# Patient Record
Sex: MALE | Race: WHITE | NOT HISPANIC OR LATINO
[De-identification: names, ages, dates, MRNs, and addresses within clinical notes are randomized per-mention and may not be internally consistent; named-entity substitution may affect disease eponyms.]

---

## 2020-03-15 ENCOUNTER — TRANSCRIPTION ENCOUNTER (OUTPATIENT)
Age: 65
End: 2020-03-15

## 2022-02-02 ENCOUNTER — NON-APPOINTMENT (OUTPATIENT)
Age: 67
End: 2022-02-02

## 2022-02-02 PROBLEM — Z82.3 FAMILY HISTORY OF STROKE: Status: ACTIVE | Noted: 2022-02-02

## 2022-02-02 PROBLEM — Z82.49 FAMILY HISTORY OF ESSENTIAL HYPERTENSION: Status: ACTIVE | Noted: 2022-02-02

## 2022-02-02 RX ORDER — ASPIRIN ENTERIC COATED TABLETS 81 MG 81 MG/1
81 TABLET, DELAYED RELEASE ORAL
Refills: 0 | Status: ACTIVE | COMMUNITY

## 2022-02-02 RX ORDER — ROSUVASTATIN CALCIUM 10 MG/1
10 TABLET, FILM COATED ORAL
Refills: 0 | Status: ACTIVE | COMMUNITY

## 2022-02-03 ENCOUNTER — APPOINTMENT (OUTPATIENT)
Dept: SURGERY | Facility: CLINIC | Age: 67
End: 2022-02-03
Payer: MEDICARE

## 2022-02-03 ENCOUNTER — NON-APPOINTMENT (OUTPATIENT)
Age: 67
End: 2022-02-03

## 2022-02-03 VITALS
SYSTOLIC BLOOD PRESSURE: 134 MMHG | HEART RATE: 51 BPM | HEIGHT: 66 IN | BODY MASS INDEX: 26.52 KG/M2 | DIASTOLIC BLOOD PRESSURE: 76 MMHG | OXYGEN SATURATION: 99 % | WEIGHT: 165 LBS

## 2022-02-03 VITALS — TEMPERATURE: 96.6 F

## 2022-02-03 VITALS — TEMPERATURE: 96.3 F

## 2022-02-03 DIAGNOSIS — Z82.49 FAMILY HISTORY OF ISCHEMIC HEART DISEASE AND OTHER DISEASES OF THE CIRCULATORY SYSTEM: ICD-10-CM

## 2022-02-03 DIAGNOSIS — Z87.891 PERSONAL HISTORY OF NICOTINE DEPENDENCE: ICD-10-CM

## 2022-02-03 DIAGNOSIS — Z78.9 OTHER SPECIFIED HEALTH STATUS: ICD-10-CM

## 2022-02-03 DIAGNOSIS — Z82.3 FAMILY HISTORY OF STROKE: ICD-10-CM

## 2022-02-03 DIAGNOSIS — Z80.1 FAMILY HISTORY OF MALIGNANT NEOPLASM OF TRACHEA, BRONCHUS AND LUNG: ICD-10-CM

## 2022-02-03 PROCEDURE — 99203 OFFICE O/P NEW LOW 30 MIN: CPT

## 2022-02-03 RX ORDER — UBIDECARENONE/VIT E ACET 100MG-5
CAPSULE ORAL
Refills: 0 | Status: ACTIVE | COMMUNITY

## 2022-02-03 RX ORDER — ASCORBIC ACID 500 MG
TABLET ORAL
Refills: 0 | Status: ACTIVE | COMMUNITY

## 2022-02-03 RX ORDER — ZINC SULFATE 50(220)MG
CAPSULE ORAL
Refills: 0 | Status: ACTIVE | COMMUNITY

## 2022-02-03 NOTE — CONSULT LETTER
[Dear  ___] : Dear  [unfilled], [Consult Letter:] : I had the pleasure of evaluating your patient, [unfilled]. [Please see my note below.] : Please see my note below. [Consult Closing:] : Thank you very much for allowing me to participate in the care of this patient.  If you have any questions, please do not hesitate to contact me. [Sincerely,] : Sincerely, [FreeTextEntry3] : Marty Hall MD, FACS

## 2022-02-03 NOTE — PLAN
[FreeTextEntry1] : Mr. SALAZAR  was told significance of findings, options, risks and benefits were explained.  Informed consent for right inguinal hernia  and umbilical hernia repairs and potential risks, benefits and alternatives (surgical options were discussed including non-surgical options or the option of no surgery) to the planned surgery were discussed in depth.  All surgical options were discussed including non-surgical treatments.  He wishes to proceed with surgery.  We will plan for surgery on at the next available date, pending any required insurance pre-certification or pre-approval. He agrees to obtain any necessary pre-operative evaluations and testing prior to surgery.\par Patient advised to seek immediate medical attention with any acute change in symptoms or with the development of any new or worsening symptoms.  Patient's questions and concerns addressed to patient's satisfaction, and patient verbalized an understanding of the information discussed.\par \par

## 2022-02-03 NOTE — HISTORY OF PRESENT ILLNESS
[de-identified] : Mr. RICKI SALAZAR is  a 66 year old male who was referred by Dr. Britni Galo with the chief complaint of having pain and swelling in his Right  groin.  He has had the condition for 1 year  and is worse when he is walking or standing.  He is stating that the hernia is getting bigger and symptomatic. He denies any fever or  night sweats. Appetite is good and weight is stable.  he also has umbilical hernia  for many years and  it causes discomfort on and off \par

## 2022-02-16 ENCOUNTER — OUTPATIENT (OUTPATIENT)
Dept: OUTPATIENT SERVICES | Facility: HOSPITAL | Age: 67
LOS: 1 days | End: 2022-02-16
Payer: MEDICARE

## 2022-02-16 VITALS
HEIGHT: 67 IN | DIASTOLIC BLOOD PRESSURE: 77 MMHG | SYSTOLIC BLOOD PRESSURE: 126 MMHG | HEART RATE: 55 BPM | OXYGEN SATURATION: 97 % | TEMPERATURE: 98 F | RESPIRATION RATE: 18 BRPM

## 2022-02-16 DIAGNOSIS — Z01.818 ENCOUNTER FOR OTHER PREPROCEDURAL EXAMINATION: ICD-10-CM

## 2022-02-16 DIAGNOSIS — K42.9 UMBILICAL HERNIA WITHOUT OBSTRUCTION OR GANGRENE: ICD-10-CM

## 2022-02-16 DIAGNOSIS — I10 ESSENTIAL (PRIMARY) HYPERTENSION: ICD-10-CM

## 2022-02-16 DIAGNOSIS — K40.90 UNILATERAL INGUINAL HERNIA, WITHOUT OBSTRUCTION OR GANGRENE, NOT SPECIFIED AS RECURRENT: ICD-10-CM

## 2022-02-16 DIAGNOSIS — Z98.890 OTHER SPECIFIED POSTPROCEDURAL STATES: Chronic | ICD-10-CM

## 2022-02-16 PROCEDURE — G0463: CPT

## 2022-02-16 NOTE — H&P PST ADULT - NSANTHOSAYNRD_GEN_A_CORE
No. ALVINO screening performed.  STOP BANG Legend: 0-2 = LOW Risk; 3-4 = INTERMEDIATE Risk; 5-8 = HIGH Risk

## 2022-02-16 NOTE — H&P PST ADULT - TOBACCO FREE, LONGEST PERIOD, PROFILE
Continue Regimen: Soolantra apply a pea sized amount to full face after cleansing once daily Otc Regimen: Continue antihistamine daily Detail Level: Zone Initiate Treatment: Hydrocortisone- apply to the affected areas twice daily X 2 weeks to the eyelids , then as needed Continue Regimen: Spironolactone 50 mg take two once nightly\\n\\nSkin Medica Retinol 20 years Continue Regimen: Ciclopirox Lather onto Scalp and face , Let Sit For 3-5mins Then Rinse. May Follow with regular Shampoo & Conditioner fir the hair .

## 2022-02-16 NOTE — H&P PST ADULT - NSICDXPASTMEDICALHX_GEN_ALL_CORE_FT
PAST MEDICAL HISTORY:  HLD (hyperlipidemia)     HTN (hypertension)     Right inguinal hernia     Umbilical hernia      PAST MEDICAL HISTORY:  COVID-19 virus infection February 2020    HLD (hyperlipidemia)     HTN (hypertension)     Right inguinal hernia     Umbilical hernia

## 2022-02-16 NOTE — H&P PST ADULT - HISTORY OF PRESENT ILLNESS
This is a 66 year old male with PMH of HLD, Hypertension who presents with c/o intermittent right inguinal pain due to hernia for . Pt reports worsening of pain with activity. Pt for inguinal hernia repair on  This is a 66 year old male with PMH of Hyperlipidemia, Hypertension, COVID-19 virus infection in February 2020 who presents with c/o constant right inguinal pain due to hernia for about 1 year. Pt reports worsening of pain with ambulation and strenuous activity. Pt also c/o umbilical hernia for years that has been increasing in size and causing minimal discomfort. Pt is scheduled for right inguinal and umbilical hernia repair on 02/23/2022.

## 2022-02-16 NOTE — H&P PST ADULT - ASSESSMENT
This is a 66 year old male with PMH of Hyperlipidemia, Hypertension, COVID-19 virus infection in February 2020 who presents with right inguinal hernia and umbilical hernia. Pt is scheduled for right inguinal and umbilical hernia repair on 02/23/2022.  ALVINO stop bang score 3. Pt never had sleep study done, is at intermediate risk for sleep apnea and at risk for pulmonary complications.

## 2022-02-23 ENCOUNTER — APPOINTMENT (OUTPATIENT)
Dept: SURGERY | Facility: HOSPITAL | Age: 67
End: 2022-02-23

## 2022-02-23 PROBLEM — U07.1 COVID-19: Chronic | Status: ACTIVE | Noted: 2022-02-16

## 2022-02-23 PROBLEM — E78.5 HYPERLIPIDEMIA, UNSPECIFIED: Chronic | Status: ACTIVE | Noted: 2022-02-16

## 2022-02-23 PROBLEM — I10 ESSENTIAL (PRIMARY) HYPERTENSION: Chronic | Status: ACTIVE | Noted: 2022-02-16

## 2022-02-23 PROBLEM — K42.9 UMBILICAL HERNIA WITHOUT OBSTRUCTION OR GANGRENE: Chronic | Status: ACTIVE | Noted: 2022-02-16

## 2022-02-23 PROBLEM — K40.90 UNILATERAL INGUINAL HERNIA, WITHOUT OBSTRUCTION OR GANGRENE, NOT SPECIFIED AS RECURRENT: Chronic | Status: ACTIVE | Noted: 2022-02-16

## 2022-03-07 ENCOUNTER — TRANSCRIPTION ENCOUNTER (OUTPATIENT)
Age: 67
End: 2022-03-07

## 2022-03-07 LAB — SARS-COV-2 N GENE NPH QL NAA+PROBE: NOT DETECTED

## 2022-03-08 ENCOUNTER — APPOINTMENT (OUTPATIENT)
Dept: SURGERY | Facility: HOSPITAL | Age: 67
End: 2022-03-08
Payer: MEDICARE

## 2022-03-08 ENCOUNTER — OUTPATIENT (OUTPATIENT)
Dept: OUTPATIENT SERVICES | Facility: HOSPITAL | Age: 67
LOS: 1 days | End: 2022-03-08
Payer: MEDICARE

## 2022-03-08 VITALS
WEIGHT: 160.06 LBS | DIASTOLIC BLOOD PRESSURE: 80 MMHG | OXYGEN SATURATION: 100 % | HEART RATE: 52 BPM | SYSTOLIC BLOOD PRESSURE: 122 MMHG | TEMPERATURE: 98 F | RESPIRATION RATE: 16 BRPM | HEIGHT: 67 IN

## 2022-03-08 VITALS
OXYGEN SATURATION: 97 % | HEART RATE: 51 BPM | TEMPERATURE: 98 F | RESPIRATION RATE: 16 BRPM | SYSTOLIC BLOOD PRESSURE: 104 MMHG | DIASTOLIC BLOOD PRESSURE: 55 MMHG

## 2022-03-08 DIAGNOSIS — K42.9 UMBILICAL HERNIA WITHOUT OBSTRUCTION OR GANGRENE: ICD-10-CM

## 2022-03-08 DIAGNOSIS — Z01.818 ENCOUNTER FOR OTHER PREPROCEDURAL EXAMINATION: ICD-10-CM

## 2022-03-08 DIAGNOSIS — Z98.890 OTHER SPECIFIED POSTPROCEDURAL STATES: Chronic | ICD-10-CM

## 2022-03-08 DIAGNOSIS — K40.90 UNILATERAL INGUINAL HERNIA, WITHOUT OBSTRUCTION OR GANGRENE, NOT SPECIFIED AS RECURRENT: ICD-10-CM

## 2022-03-08 PROCEDURE — 49585: CPT | Mod: AS

## 2022-03-08 PROCEDURE — 49585: CPT

## 2022-03-08 PROCEDURE — 49505 PRP I/HERN INIT REDUC >5 YR: CPT | Mod: AS,RT

## 2022-03-08 PROCEDURE — C1781: CPT

## 2022-03-08 PROCEDURE — 49505 PRP I/HERN INIT REDUC >5 YR: CPT | Mod: RT

## 2022-03-08 PROCEDURE — 49505 PRP I/HERN INIT REDUC >5 YR: CPT

## 2022-03-08 DEVICE — MESH HERNIA MARLEX 3X6IN: Type: IMPLANTABLE DEVICE | Status: FUNCTIONAL

## 2022-03-08 DEVICE — MESH HERNIA PROLENE 3X6IN: Type: IMPLANTABLE DEVICE | Status: FUNCTIONAL

## 2022-03-08 RX ORDER — OXYCODONE AND ACETAMINOPHEN 5; 325 MG/1; MG/1
1 TABLET ORAL EVERY 4 HOURS
Refills: 0 | Status: DISCONTINUED | OUTPATIENT
Start: 2022-03-08 | End: 2022-03-08

## 2022-03-08 RX ORDER — FENTANYL CITRATE 50 UG/ML
50 INJECTION INTRAVENOUS
Refills: 0 | Status: DISCONTINUED | OUTPATIENT
Start: 2022-03-08 | End: 2022-03-08

## 2022-03-08 RX ORDER — SODIUM CHLORIDE 9 MG/ML
1000 INJECTION, SOLUTION INTRAVENOUS
Refills: 0 | Status: DISCONTINUED | OUTPATIENT
Start: 2022-03-08 | End: 2022-03-15

## 2022-03-08 RX ORDER — FENTANYL CITRATE 50 UG/ML
25 INJECTION INTRAVENOUS
Refills: 0 | Status: DISCONTINUED | OUTPATIENT
Start: 2022-03-08 | End: 2022-03-08

## 2022-03-08 RX ORDER — ACETAMINOPHEN 500 MG
2 TABLET ORAL
Qty: 0 | Refills: 0 | DISCHARGE

## 2022-03-08 RX ORDER — SODIUM CHLORIDE 9 MG/ML
3 INJECTION INTRAMUSCULAR; INTRAVENOUS; SUBCUTANEOUS EVERY 8 HOURS
Refills: 0 | Status: DISCONTINUED | OUTPATIENT
Start: 2022-03-08 | End: 2022-03-08

## 2022-03-08 RX ORDER — OXYCODONE AND ACETAMINOPHEN 5; 325 MG/1; MG/1
1 TABLET ORAL
Qty: 6 | Refills: 0
Start: 2022-03-08

## 2022-03-08 RX ADMIN — SODIUM CHLORIDE 3 MILLILITER(S): 9 INJECTION INTRAMUSCULAR; INTRAVENOUS; SUBCUTANEOUS at 10:18

## 2022-03-08 RX ADMIN — FENTANYL CITRATE 50 MICROGRAM(S): 50 INJECTION INTRAVENOUS at 12:44

## 2022-03-08 RX ADMIN — FENTANYL CITRATE 50 MICROGRAM(S): 50 INJECTION INTRAVENOUS at 13:10

## 2022-03-08 RX ADMIN — OXYCODONE AND ACETAMINOPHEN 1 TABLET(S): 5; 325 TABLET ORAL at 14:23

## 2022-03-08 RX ADMIN — OXYCODONE AND ACETAMINOPHEN 1 TABLET(S): 5; 325 TABLET ORAL at 13:51

## 2022-03-08 NOTE — ASU DISCHARGE PLAN (ADULT/PEDIATRIC) - CARE PROVIDER_API CALL
Marty Hall)  Surgery  [address] [facility], [place] [address]  Phone: [phone]  Fax: [phone]  Follow Up Time:

## 2022-03-08 NOTE — ASU DISCHARGE PLAN (ADULT/PEDIATRIC) - NS MD DC FALL RISK RISK
For information on Fall & Injury Prevention, visit: [email or web address] OR  [email or web address] OR  https://www.cdc.gov/steadi/patient.html

## 2022-03-08 NOTE — BRIEF OPERATIVE NOTE - NSICDXBRIEFPOSTOP_GEN_ALL_CORE_FT
POST-OP DIAGNOSIS:  Inguinal hernia 08-Mar-2022 12:24:23  Arvind Tovar  Hernia, umbilical 08-Mar-2022 12:24:45  Arvind Tovar

## 2022-03-08 NOTE — ASU PATIENT PROFILE, ADULT - NSICDXPASTMEDICALHX_GEN_ALL_CORE_FT
PAST MEDICAL HISTORY:  COVID-19 virus infection February 2020    HLD (hyperlipidemia)     HTN (hypertension)     Right inguinal hernia     Umbilical hernia

## 2022-03-08 NOTE — BRIEF OPERATIVE NOTE - NSICDXBRIEFPROCEDURE_GEN_ALL_CORE_FT
PROCEDURES:  Repair, hernia, inguinal, open, using mesh, adult 08-Mar-2022 12:23:33  Arvind Tovar  Open repair of umbilical hernia in adult 08-Mar-2022 12:23:46  Arvind Tovar

## 2022-03-08 NOTE — BRIEF OPERATIVE NOTE - NSICDXBRIEFPREOP_GEN_ALL_CORE_FT
PRE-OP DIAGNOSIS:  Inguinal hernia 08-Mar-2022 12:23:57  Arvind Tovar  Umbilical hernia 08-Mar-2022 12:24:11  Arvind Tovar

## 2022-03-08 NOTE — ASU PATIENT PROFILE, ADULT - FALL HARM RISK - UNIVERSAL INTERVENTIONS
Bed in lowest position, wheels locked, appropriate side rails in place/Call bell, personal items and telephone in reach/Instruct patient to call for assistance before getting out of bed or chair/Non-slip footwear when patient is out of bed/[place] to call system/Physically safe environment - no spills, clutter or unnecessary equipment/Purposeful Proactive Rounding/Room/bathroom lighting operational, light cord in reach

## 2022-03-24 ENCOUNTER — APPOINTMENT (OUTPATIENT)
Dept: SURGERY | Facility: CLINIC | Age: 67
End: 2022-03-24
Payer: MEDICARE

## 2022-03-24 VITALS — TEMPERATURE: 97.1 F

## 2022-03-24 PROCEDURE — 99024 POSTOP FOLLOW-UP VISIT: CPT

## 2022-03-24 NOTE — PHYSICAL EXAM
[Calm] : calm [de-identified] : He  is alert, well-groomed, and not in apparent distress \par   [de-identified] : right groin and umbilicus Surgical wounds are  healing well.   no signs of  inflammation or infection. no recurrence

## 2022-03-24 NOTE — HISTORY OF PRESENT ILLNESS
[de-identified] : Mr. SALAZAR  is s/p Right inguinal hernia repair with Marlex mesh (Floresita procedure) and umbilical hernia repair on 03/08/2022.  Today  Mr. SALAZAR offers no complaints. patient reports no fever or  chills. patient reports discomfort in the surgical area.  His surgical wounds are  healing well. No signs of inflammation, infection or exudate. no recurrence. patient reports good bowel movements and appetite.

## 2022-03-24 NOTE — ASSESSMENT
[FreeTextEntry1] : Mr. SALAZAR is doing well, with excellent post-operative recovery. All surgical incisions are healing well and as expected. There is no evidence of infection or complication, and he is progressing as expected. Post-operative wound care, activity, restrictions and precautions reinforced. Patient instructed to refrain from any heavy lifting greater than 10-15 pounds for at least 4-6 weeks post-operatively. Patient's questions and concerns addressed to patient's satisfaction.\par

## 2022-08-08 PROBLEM — K40.90 HERNIA, INGUINAL, RIGHT: Status: ACTIVE | Noted: 2022-02-03

## 2022-08-08 PROBLEM — K42.9 UMBILICAL HERNIA: Status: ACTIVE | Noted: 2022-02-03

## 2022-08-11 ENCOUNTER — APPOINTMENT (OUTPATIENT)
Dept: SURGERY | Facility: CLINIC | Age: 67
End: 2022-08-11

## 2022-08-11 VITALS
HEIGHT: 66 IN | HEART RATE: 49 BPM | BODY MASS INDEX: 25.23 KG/M2 | WEIGHT: 157 LBS | SYSTOLIC BLOOD PRESSURE: 129 MMHG | DIASTOLIC BLOOD PRESSURE: 76 MMHG

## 2022-08-11 DIAGNOSIS — K42.9 UMBILICAL HERNIA W/OUT OBSTRUCTION OR GANGRENE: ICD-10-CM

## 2022-08-11 DIAGNOSIS — K40.90 UNILATERAL INGUINAL HERNIA, W/OUT OBSTRUCTION OR GANGRENE, NOT SPECIFIED AS RECURRENT: ICD-10-CM

## 2022-08-11 PROCEDURE — 99213 OFFICE O/P EST LOW 20 MIN: CPT

## 2022-08-11 NOTE — ASSESSMENT
[FreeTextEntry1] : Mr. SALAZAR is doing well, with excellent post-operative recovery. All surgical incisions  healed well and as expected. There is no evidence of  complication or recurrence, and he is progressing as expected. Patient can return to normal activity without restrictions.  Patient's questions and concerns addressed to patient's satisfaction.\par

## 2022-08-11 NOTE — PLAN
[FreeTextEntry1] : Mr. SALAZAR will follow up  if needed. Warning signs, follow up, and restrictions were discussed with the patient.

## 2022-08-11 NOTE — PHYSICAL EXAM
[Alert] : alert [Oriented to Person] : oriented to person [Oriented to Place] : oriented to place [Oriented to Time] : oriented to time [Calm] : calm [de-identified] : He  is alert, well-groomed, and in NAD\par   [de-identified] : anicteric.  Nasal mucosa pink, septum midline. Oral mucosa pink.  Tongue midline, Pharynx without exudates.\par   [de-identified] : Neck supple. Trachea midline. Thyroid isthmus barely palpable, lobes not felt.\par   [de-identified] :  no hernia recurrence in the right groin and umbilicus

## 2023-06-10 NOTE — HISTORY OF PRESENT ILLNESS
[de-identified] : Mr. SALAZAR  is s/p Right inguinal hernia repair with Marlex mesh (Floresita procedure) and umbilical hernia repair on 03/08/2022.   Today  Mr. SALAZAR offers no complaints. patient reports no fever or  chills. patient reports pain in the right groin for 1 week. he is concerned for recurrence.  patient reports good bowel movements and appetite. 
Awake/Alert/Cooperative

## 2023-11-06 ENCOUNTER — APPOINTMENT (OUTPATIENT)
Dept: UROLOGY | Facility: CLINIC | Age: 68
End: 2023-11-06
Payer: MEDICARE

## 2023-11-06 VITALS
OXYGEN SATURATION: 97 % | SYSTOLIC BLOOD PRESSURE: 127 MMHG | TEMPERATURE: 98.2 F | WEIGHT: 160 LBS | DIASTOLIC BLOOD PRESSURE: 81 MMHG | HEIGHT: 66 IN | HEART RATE: 68 BPM | BODY MASS INDEX: 25.71 KG/M2

## 2023-11-06 DIAGNOSIS — Z78.9 OTHER SPECIFIED HEALTH STATUS: ICD-10-CM

## 2023-11-06 DIAGNOSIS — Z86.39 PERSONAL HISTORY OF OTHER ENDOCRINE, NUTRITIONAL AND METABOLIC DISEASE: ICD-10-CM

## 2023-11-06 DIAGNOSIS — Z95.0 PRESENCE OF CARDIAC PACEMAKER: ICD-10-CM

## 2023-11-06 PROCEDURE — 99204 OFFICE O/P NEW MOD 45 MIN: CPT

## 2023-11-06 RX ORDER — LOSARTAN POTASSIUM 25 MG/1
25 TABLET, FILM COATED ORAL
Refills: 0 | Status: ACTIVE | COMMUNITY

## 2023-11-06 RX ORDER — SIMVASTATIN 20 MG/1
20 TABLET, FILM COATED ORAL
Refills: 0 | Status: ACTIVE | COMMUNITY

## 2023-11-07 RX ORDER — ENEMA 19; 7 G/133ML; G/133ML
7-19 ENEMA RECTAL
Qty: 1 | Refills: 0 | Status: ACTIVE | COMMUNITY
Start: 2023-11-07 | End: 1900-01-01

## 2023-11-14 ENCOUNTER — OUTPATIENT (OUTPATIENT)
Dept: OUTPATIENT SERVICES | Facility: HOSPITAL | Age: 68
LOS: 1 days | End: 2023-11-14
Payer: MEDICARE

## 2023-11-14 ENCOUNTER — APPOINTMENT (OUTPATIENT)
Dept: MRI IMAGING | Facility: HOSPITAL | Age: 68
End: 2023-11-14
Payer: MEDICARE

## 2023-11-14 DIAGNOSIS — R97.20 ELEVATED PROSTATE SPECIFIC ANTIGEN [PSA]: ICD-10-CM

## 2023-11-14 DIAGNOSIS — Z98.890 OTHER SPECIFIED POSTPROCEDURAL STATES: Chronic | ICD-10-CM

## 2023-11-14 PROCEDURE — 71046 X-RAY EXAM CHEST 2 VIEWS: CPT | Mod: 26

## 2023-11-14 PROCEDURE — 72197 MRI PELVIS W/O & W/DYE: CPT

## 2023-11-14 PROCEDURE — 72197 MRI PELVIS W/O & W/DYE: CPT | Mod: 26,MH

## 2023-11-14 PROCEDURE — 71046 X-RAY EXAM CHEST 2 VIEWS: CPT

## 2023-11-20 ENCOUNTER — TRANSCRIPTION ENCOUNTER (OUTPATIENT)
Age: 68
End: 2023-11-20

## 2023-11-27 ENCOUNTER — NON-APPOINTMENT (OUTPATIENT)
Age: 68
End: 2023-11-27

## 2023-12-04 ENCOUNTER — APPOINTMENT (OUTPATIENT)
Dept: UROLOGY | Facility: CLINIC | Age: 68
End: 2023-12-04
Payer: MEDICARE

## 2023-12-04 VITALS
TEMPERATURE: 98.1 F | HEIGHT: 66 IN | SYSTOLIC BLOOD PRESSURE: 126 MMHG | BODY MASS INDEX: 25.71 KG/M2 | OXYGEN SATURATION: 98 % | DIASTOLIC BLOOD PRESSURE: 76 MMHG | WEIGHT: 160 LBS | HEART RATE: 66 BPM

## 2023-12-04 DIAGNOSIS — R97.20 ELEVATED PROSTATE, SPECIFIC ANTIGEN [PSA]: ICD-10-CM

## 2023-12-04 PROCEDURE — 99214 OFFICE O/P EST MOD 30 MIN: CPT

## 2023-12-05 LAB
ANION GAP SERPL CALC-SCNC: 13 MMOL/L
APPEARANCE: CLEAR
BACTERIA: NEGATIVE /HPF
BILIRUBIN URINE: NEGATIVE
BLOOD URINE: NEGATIVE
BUN SERPL-MCNC: 16 MG/DL
CALCIUM SERPL-MCNC: 9.6 MG/DL
CAST: 0 /LPF
CHLORIDE SERPL-SCNC: 105 MMOL/L
CO2 SERPL-SCNC: 27 MMOL/L
COLOR: YELLOW
CREAT SERPL-MCNC: 0.85 MG/DL
EGFR: 95 ML/MIN/1.73M2
EPITHELIAL CELLS: 0 /HPF
GLUCOSE QUALITATIVE U: NEGATIVE MG/DL
GLUCOSE SERPL-MCNC: 88 MG/DL
HCT VFR BLD CALC: 46.2 %
HGB BLD-MCNC: 14.9 G/DL
KETONES URINE: ABNORMAL MG/DL
LEUKOCYTE ESTERASE URINE: NEGATIVE
MCHC RBC-ENTMCNC: 30 PG
MCHC RBC-ENTMCNC: 32.3 GM/DL
MCV RBC AUTO: 93.1 FL
MICROSCOPIC-UA: NORMAL
NITRITE URINE: NEGATIVE
PH URINE: 6
PLATELET # BLD AUTO: 171 K/UL
POTASSIUM SERPL-SCNC: 4.5 MMOL/L
PROTEIN URINE: NEGATIVE MG/DL
PSA FREE FLD-MCNC: 11 %
PSA FREE SERPL-MCNC: 0.84 NG/ML
PSA SERPL-MCNC: 7.85 NG/ML
RBC # BLD: 4.96 M/UL
RBC # FLD: 13.9 %
RED BLOOD CELLS URINE: 0 /HPF
SODIUM SERPL-SCNC: 144 MMOL/L
SPECIFIC GRAVITY URINE: 1.01
UROBILINOGEN URINE: 0.2 MG/DL
WBC # FLD AUTO: 6.54 K/UL
WHITE BLOOD CELLS URINE: 0 /HPF

## 2023-12-06 LAB — BACTERIA UR CULT: NORMAL

## 2024-01-17 ENCOUNTER — TRANSCRIPTION ENCOUNTER (OUTPATIENT)
Age: 69
End: 2024-01-17

## 2024-01-17 NOTE — ASU PATIENT PROFILE, ADULT - NSICDXPASTSURGICALHX_GEN_ALL_CORE_FT
PAST SURGICAL HISTORY:  H/O cataract extraction bilateral    H/O hemorrhoidectomy     S/P hernia repair umbilical, inguinal    Status cardiac pacemaker

## 2024-01-17 NOTE — ASU PATIENT PROFILE, ADULT - NSICDXPASTMEDICALHX_GEN_ALL_CORE_FT
PAST MEDICAL HISTORY:  Afib     BPH with elevated PSA     Hyperlipidemia     Hypertension     Lung nodules

## 2024-01-17 NOTE — ASU PATIENT PROFILE, ADULT - NS PREOP UNDERSTANDS INFO
No solid food/dairy/candy/gum after midnight; water allowed before 08:30am tomorrow; patient reminded to come with photo ID/insurance/credit card; dress comfortable; no jewelries/contact/lens/valuables; no smoking/drinking alcohol/recreational drug use today; escort must have a photo ID; address and callback number given./yes

## 2024-01-18 ENCOUNTER — RESULT REVIEW (OUTPATIENT)
Age: 69
End: 2024-01-18

## 2024-01-18 ENCOUNTER — TRANSCRIPTION ENCOUNTER (OUTPATIENT)
Age: 69
End: 2024-01-18

## 2024-01-18 ENCOUNTER — OUTPATIENT (OUTPATIENT)
Dept: OUTPATIENT SERVICES | Facility: HOSPITAL | Age: 69
LOS: 1 days | End: 2024-01-18
Payer: MEDICARE

## 2024-01-18 ENCOUNTER — OUTPATIENT (OUTPATIENT)
Dept: OUTPATIENT SERVICES | Facility: HOSPITAL | Age: 69
LOS: 1 days | Discharge: HOME | End: 2024-01-18
Payer: MEDICARE

## 2024-01-18 ENCOUNTER — APPOINTMENT (OUTPATIENT)
Dept: UROLOGY | Facility: AMBULATORY SURGERY CENTER | Age: 69
End: 2024-01-18
Payer: MEDICARE

## 2024-01-18 VITALS
WEIGHT: 159.17 LBS | RESPIRATION RATE: 14 BRPM | OXYGEN SATURATION: 97 % | HEIGHT: 66 IN | TEMPERATURE: 98 F | SYSTOLIC BLOOD PRESSURE: 135 MMHG | DIASTOLIC BLOOD PRESSURE: 81 MMHG | HEART RATE: 70 BPM

## 2024-01-18 VITALS
SYSTOLIC BLOOD PRESSURE: 100 MMHG | RESPIRATION RATE: 13 BRPM | HEART RATE: 60 BPM | DIASTOLIC BLOOD PRESSURE: 63 MMHG | TEMPERATURE: 98 F | OXYGEN SATURATION: 96 %

## 2024-01-18 DIAGNOSIS — Z95.0 PRESENCE OF CARDIAC PACEMAKER: Chronic | ICD-10-CM

## 2024-01-18 DIAGNOSIS — Z98.890 OTHER SPECIFIED POSTPROCEDURAL STATES: Chronic | ICD-10-CM

## 2024-01-18 DIAGNOSIS — R97.20 ELEVATED PROSTATE SPECIFIC ANTIGEN [PSA]: ICD-10-CM

## 2024-01-18 DIAGNOSIS — Z98.49 CATARACT EXTRACTION STATUS, UNSPECIFIED EYE: Chronic | ICD-10-CM

## 2024-01-18 PROCEDURE — C8001: CPT

## 2024-01-18 PROCEDURE — 55700: CPT | Mod: 22

## 2024-01-18 PROCEDURE — 76999F: CUSTOM | Mod: 26

## 2024-01-18 RX ORDER — FENTANYL CITRATE 50 UG/ML
25 INJECTION INTRAVENOUS
Refills: 0 | Status: DISCONTINUED | OUTPATIENT
Start: 2024-01-18 | End: 2024-01-18

## 2024-01-18 RX ORDER — ACETAMINOPHEN 500 MG
975 TABLET ORAL ONCE
Refills: 0 | Status: DISCONTINUED | OUTPATIENT
Start: 2024-01-18 | End: 2024-01-18

## 2024-01-18 RX ORDER — SODIUM CHLORIDE 9 MG/ML
1000 INJECTION, SOLUTION INTRAVENOUS
Refills: 0 | Status: DISCONTINUED | OUTPATIENT
Start: 2024-01-18 | End: 2024-01-18

## 2024-01-18 RX ORDER — ONDANSETRON 8 MG/1
4 TABLET, FILM COATED ORAL ONCE
Refills: 0 | Status: DISCONTINUED | OUTPATIENT
Start: 2024-01-18 | End: 2024-01-18

## 2024-01-18 NOTE — ASU DISCHARGE PLAN (ADULT/PEDIATRIC) - ASU DC SPECIAL INSTRUCTIONSFT
PROSTATE BIOPSY    GENERAL: Expect blood in the urine, stool, and ejaculate for up to two (2) months postoperatively. This is normal and to be expected after this procedure. Increase hydration to keep the urine as clear as possible.    SURGICAL WOUND: There are often lumps and bumps that can be felt in the perineum (skin between scrotum and anus) for up to two (2) months or more post operatively. These are of no concern and with time they will soften and disappear.  Any “black and blue” bruising areas will also resolve.  You may apply an ice-pack for 15 minutes out of every hour for the first 24 -36 hours to minimize pain and swelling. You may apply over the counter Bacitracin to the wound several times a day, and keep covered with over the counter gauze as necessary.    CATHETER: Some patients are sent home with a Lux catheter, while others go home urinating on their own. A Lux catheter continuously drains the urine from the bladder. If you still have a catheter, the nurses will review instructions and care before you go home.     PAIN: You may have some intermittent pain for up to six (6) weeks post operatively. Pain does not signify any problem unless associated with fever, chills, or inability to void.  If you experience any fevers or chills please call immediately as this may be signs of an infection. You may take Tylenol (acetaminophen) 650-975mg and/or Motrin (ibuprofen) 400-600mg, both available over the counter, for pain every 6 hours as needed. Do not exceed 4000mg of Tylenol (acetaminophen) daily. You may alternate these medications such that you take one or the other every 3 hours for around the clock pain coverage.    ANTICOAGULATION: If you are taking any blood thinning medications, please discuss with your urologist prior to restarting these medications unless otherwise specified.    BATHING: You may shower with soap and water, and pat dry the needle insertion sites in the perineum. Avoid sitting in water for prolonged periods of time for the next few days.    DIET: You may resume your regular diet and regular medication regimen.    ACTIVITY: No heavy lifting or strenuous exercise until you are evaluated at your post-operative appointment. Otherwise, you may return to your usual level of physical activity.    FOLLOW-UP: If you did not already schedule your post-operative appointment, please call your urologist to schedule and follow-up appointment.    CALL YOUR UROLOGIST IF: You have any bleeding that does not stop, inability to void >8 hours, fever over 100.4 F, chills, persistent nausea/vomiting, changes in your incision concerning for infection, or if your pain is not controlled on your discharge pain medications.

## 2024-01-18 NOTE — BRIEF OPERATIVE NOTE - NSICDXBRIEFPROCEDURE_GEN_ALL_CORE_FT
PROCEDURES:  Biopsy, prostate, perineal approach, with integrated MRI-US guidance 18-Jan-2024 10:49:40  Hector Jim

## 2024-01-18 NOTE — ASU DISCHARGE PLAN (ADULT/PEDIATRIC) - CARE PROVIDER_API CALL
Alison Turk  Urology  [address], [address] [place] [address]  Phone: [phone]  Fax: [phone]  Follow Up Time: 2 weeks

## 2024-01-19 ENCOUNTER — NON-APPOINTMENT (OUTPATIENT)
Age: 69
End: 2024-01-19

## 2024-01-22 ENCOUNTER — NON-APPOINTMENT (OUTPATIENT)
Age: 69
End: 2024-01-22

## 2024-01-22 LAB — SURGICAL PATHOLOGY STUDY: SIGNIFICANT CHANGE UP

## 2024-01-24 NOTE — PACU DISCHARGE NOTE - THE ANESTHESIA ORDERS USED IN THE PACU ORDER SET WILL BE DISCONTINUED UPON TRANSFER OF THIS PATIENT
Detail Level: Generalized Quality 226: Preventive Care And Screening: Tobacco Use: Screening And Cessation Intervention: Patient screened for tobacco use and is an ex/non-smoker Quality 431: Preventive Care And Screening: Unhealthy Alcohol Use - Screening: Patient not identified as an unhealthy alcohol user when screened for unhealthy alcohol use using a systematic screening method Quality 130: Documentation Of Current Medications In The Medical Record: Current Medications Documented Statement Selected

## 2024-01-25 ENCOUNTER — APPOINTMENT (OUTPATIENT)
Dept: NUCLEAR MEDICINE | Facility: HOSPITAL | Age: 69
End: 2024-01-25

## 2024-01-25 ENCOUNTER — OUTPATIENT (OUTPATIENT)
Dept: OUTPATIENT SERVICES | Facility: HOSPITAL | Age: 69
LOS: 1 days | End: 2024-01-25
Payer: MEDICARE

## 2024-01-25 DIAGNOSIS — Z98.890 OTHER SPECIFIED POSTPROCEDURAL STATES: Chronic | ICD-10-CM

## 2024-01-25 DIAGNOSIS — Z95.0 PRESENCE OF CARDIAC PACEMAKER: Chronic | ICD-10-CM

## 2024-01-25 DIAGNOSIS — Z98.49 CATARACT EXTRACTION STATUS, UNSPECIFIED EYE: Chronic | ICD-10-CM

## 2024-01-25 PROCEDURE — 82962 GLUCOSE BLOOD TEST: CPT

## 2024-01-25 PROCEDURE — A9595: CPT

## 2024-01-25 PROCEDURE — 78816 PET IMAGE W/CT FULL BODY: CPT

## 2024-01-26 ENCOUNTER — NON-APPOINTMENT (OUTPATIENT)
Age: 69
End: 2024-01-26

## 2024-01-29 ENCOUNTER — APPOINTMENT (OUTPATIENT)
Dept: UROLOGY | Facility: CLINIC | Age: 69
End: 2024-01-29
Payer: MEDICARE

## 2024-01-29 VITALS
WEIGHT: 152 LBS | HEIGHT: 66 IN | OXYGEN SATURATION: 96 % | BODY MASS INDEX: 24.43 KG/M2 | TEMPERATURE: 98 F | HEART RATE: 72 BPM | DIASTOLIC BLOOD PRESSURE: 71 MMHG | SYSTOLIC BLOOD PRESSURE: 118 MMHG

## 2024-01-29 PROCEDURE — 99215 OFFICE O/P EST HI 40 MIN: CPT

## 2024-01-29 NOTE — REVIEW OF SYSTEMS
[see HPI] : see HPI [Negative] : Musculoskeletal [Fever] : no fever [Chills] : no chills [Chest Pain] : no chest pain [Palpitations] : no palpitations [Shortness Of Breath] : no shortness of breath

## 2024-01-29 NOTE — HISTORY OF PRESENT ILLNESS
[FreeTextEntry1] : Dear Dr. Avila  Thank you so much for the referral to help care for your patient.  Chief Complaint: Prostate Cancer  Date of first visit: 11/06/2023  RICKI SALAZAR is a 68 year old man who presents for discussion post biopsy for new diagnosis of GG4 prostate cancer.  He underwent standard TRUS biopsy (Dr. Avila) which was benign. No family history of prostate cancer. The patient denies any bothersome urinary symptoms, nocturia 1x, not taking any medications at this time. He has never had any dedicated prostate imaging, he has a pacemaker (placed 1-year ago, and was told cannot undergo MRI). Denies any hx of hematuria (aside from post-biopsy) or UTI/retention.  Pacemaker - [employer] XT DR MARTIN SureScan Pacemaker (MR Conditional product). Implanted 10-Hansel-2023; Serial # [id number], Model: 5076-58/[id number] Overall doing well post-biopsy, some noted hematuria; however, this is improving. Biopsy came back Celine 4+4=8, PSMA PET ordered which was overall negative for metastasis, indetreminate external iliac node (favor benign).   PMH: HTN, Hyperlipidemia, Bradycardia PSH: Pacemaker placement, cataract surgery, right inguinal hernia repair, ventral hernia repair Meds: Aspirin 81mg  Select MDx 11/06/2023 Elevated risk: Cayuga score (GS)>7  PSA Hx: 7.85 12/04/2023 7.0 09/21/2023 4.20 08/15/2022  CT Hx: CT abdomen pelvis  05/15/2023- Multiple subcentimeter renal cortical hypodensities bilaterally are to small characterize, and are statistically most likely represent simple cysts, and are stable compared to 6/18/2020. If further evaluation is clinically warranted to evaluate a reported renal mass identified on outside ultrasound which is not been submitted for comparison, a follow up ultrasound or pre and postcontrast MR of the abdomen would be suggested as clinically warranted.  MRI Hx [facility] at [facility] on 11/14/2023. 86cc prostate No MRI targetable lesions. Extruded BPH into the left peripheral zone. PIRADS 2 - Low (clinically significant cancer is unlikely to be present). No LAD No EPE, No Bony Lesions. The images have been reviewed and clinical implications discussed with the patient. on review, there is a left mid pzpl lesion image #22  Biopsy Hx 01/18/2024 with  -Left posterior apex: (Cayuga score 4+4=8), involving 40% of biopsy material. -Left mid PZPL: (Celine score 4+4=8),  involving 40%, 30% and 10% of biopsy material.  06/15/2023 - benign (Dr. Avila) - 12 core (non-targeted)   12/04/2023 IPSS 0 QOL 0 MED NR  The patient denies fevers, chills, nausea and or vomiting and no unexplained weight loss.  All pertinent laboratory, films and physician notes were reviewed. Questionnaire results were discussed with patient.  Greater than 50% of this 40 minute visit was spent discussing the options for treatment and management of the patient's localized prostate cancer.   Discussion with the patient: Risks/Benefits/FDA recommendations for prostate cancer screening, the natural history of prostate cancer, the concept of "competing risks", options for treatment including active surveillance, open and laparoscopic (Robotic) radical prostatectomy, external beam radiation therapy, interstitial brachytherapy ("seeds"), combined therapies, hormonal therapies, orchiectomy, and cryotherapy. The potential side effects, early and delayed risks, and effectiveness of each treatment was discussed. The specific details of this patient's disease and their bearing on the above were discussed. The patient was offered the opportunity to meet with the Radiation Oncologists. The patient asked appropriate contextual questions, indicating a good level of understanding.   We discussed the surgical options for management, including robotic and open prostatectomy. The patient understands that the risks of these procedures include bleeding infection, damage to the rectum and surrounding organs, and ED and urinary incontinence, both of which may be persistent. The advantages include removing the entire prostate for more accurate pathologic staging. There is more blood loss with the open approach than with the laparoscopic approach.   We also discussed the options of radiation (external beam, stereotactic body radiation therapy, brachytherapy or combination therapies). Definitive brachytherapy at our institution is done with Pd-103 either as monotherapy or in combination with external beam radiation (Dependent on risk).  The patient understands that the risks of radiation include increased LUTS, diarrhea, hematuria, difficulty urinating, ED, and urinary frequency. Based upon the patient's PSA/pathology and risk stratification, the patient would need androgen deprivation in the neoadjuvant and adjuvant setting if he chooses a radiation monotherapy.   We also discussed active surveillance. The patient understands that this is not a treatment but a way of monitoring potentially indolent disease. The patient understands that this has minimal side effects but there is a risk of disease progression.   We have discussed the use of focal therapy and cancer control. The goal be to treat the high-risk area and follow the low-risk areas which are not clinically significant. I describe the difference between whole gland therapy and focal therapy with respect to cancerous control versus cancer cure. We talked about the risks of focal therapy damage to adjacent structures; however, the probability of urinary incontinence and erectile dysfunction is lower.   The patient and I discussed all of these options as well as their risks and benefits, and I believe I answered his questions. Based on the patient's disease characteristics, I feel the patient would be a good candidate for surgery or radiation, we discussed given lesion was focal 5 mm and extremely we circumscribed.   The patient decided to consider focal and will follow up with Dr. Avila as well to discuss.  PET CT is negative for METS.

## 2024-01-29 NOTE — PHYSICAL EXAM
[General Appearance - Well Developed] : well developed [General Appearance - Well Nourished] : well nourished [Normal Appearance] : normal appearance [General Appearance - In No Acute Distress] : no acute distress [Edema] : no peripheral edema [] : no respiratory distress [Exaggerated Use Of Accessory Muscles For Inspiration] : no accessory muscle use [Abdomen Soft] : soft [Abdomen Tenderness] : non-tender [Normal Station and Gait] : the gait and station were normal for the patient's age [Skin Lesions] : no skin lesions [Oriented To Time, Place, And Person] : oriented to person, place, and time

## 2024-01-30 PROBLEM — R91.8 OTHER NONSPECIFIC ABNORMAL FINDING OF LUNG FIELD: Chronic | Status: ACTIVE | Noted: 2024-01-17

## 2024-01-30 PROBLEM — N40.0 BENIGN PROSTATIC HYPERPLASIA WITHOUT LOWER URINARY TRACT SYMPTOMS: Chronic | Status: ACTIVE | Noted: 2024-01-17

## 2024-01-30 PROBLEM — I48.91 UNSPECIFIED ATRIAL FIBRILLATION: Chronic | Status: ACTIVE | Noted: 2024-01-17

## 2024-02-09 ENCOUNTER — NON-APPOINTMENT (OUTPATIENT)
Age: 69
End: 2024-02-09

## 2024-02-12 ENCOUNTER — APPOINTMENT (OUTPATIENT)
Dept: UROLOGY | Facility: CLINIC | Age: 69
End: 2024-02-12
Payer: MEDICARE

## 2024-02-12 VITALS
DIASTOLIC BLOOD PRESSURE: 78 MMHG | SYSTOLIC BLOOD PRESSURE: 115 MMHG | HEIGHT: 66 IN | HEART RATE: 66 BPM | BODY MASS INDEX: 24.43 KG/M2 | OXYGEN SATURATION: 98 % | TEMPERATURE: 98 F | WEIGHT: 152 LBS

## 2024-02-12 PROCEDURE — 99213 OFFICE O/P EST LOW 20 MIN: CPT | Mod: 25

## 2024-02-12 PROCEDURE — 55874Z: CUSTOM

## 2024-02-12 NOTE — PHYSICAL EXAM
[FreeTextEntry1] : 69 yo M who presents with elevated PSA. Standard TRUS biopsy was negative (6/15/23). Pt has no family history, has not had any imaging (pacemaker).  Prostate Cancer (4+4=8) - 7.0 (9/21/23) < 4.20 (8/15/22) - Previous TRUS (Dr. Avila) biopsy (non-targeted) negative - MRI (pace-maker protocol) read as negative; however, we added a small left mid pzpm lesion that was ultimately positive for GG4 CaP. - Repeat MR-targeted biopsy showed Celine 4+4=8 (extremely small lesion 5mm). Discussed r/b/a of all treatment options including surgery, radiation, and off-trial focal therapy. Discussed that if patient is interested in focal therapy that the treatment is not standard-of-care; however, given the location and localized size of lesion, it is a reasonable option. He understands that he would be on surveillance protocol after focal therapy (including post-ablation MRI and confirmatory biopsy at one-year). - PSMA negative  - Space OAR today, preop labs 2/12/24 Cryoablation March 14th  Thank you very much for allowing me to assist in the care of this patient. Please do not hesitate to contact me with any additional questions or concerns.      Sincerely,     Wing Turk D.O. Professor of Urology and Radiology  of Urology at [facility] Director for Prostate Cancer [address] [place] [address], 5[address] Floor [facility], [facility] Phone: [phone]

## 2024-02-12 NOTE — HISTORY OF PRESENT ILLNESS
[FreeTextEntry1] : Dear Dr. Avila  Thank you so much for the referral to help care for your patient.  Chief Complaint: Prostate Cancer Date of first visit: 11/06/2023  RICKI SALAZAR is a 68 year old man who presents for discussion post biopsy for new diagnosis of GG4 prostate cancer. He underwent standard TRUS biopsy (Dr. Avila) which was benign. No family history of prostate cancer. The patient denies any bothersome urinary symptoms, nocturia 1x, not taking any medications at this time. He has never had any dedicated prostate imaging, he has a pacemaker (placed 1-year ago, and was told cannot undergo MRI).   Pacemaker - [place] XT DR MARTIN SureScan Pacemaker (MR Conditional product). Implanted 10-Hansel-2023; Serial # [id number], Model: 5076-58/W1DR01 Overall doing well post-biopsy, some noted hematuria; however, this is improving. Biopsy came back [place] 4+4=8, PSMA PET ordered which was overall negative for metastasis, indetreminate external iliac node (favor benign).  PMH: HTN, Hyperlipidemia, Bradycardia PSH: Pacemaker placement, cataract surgery, right inguinal hernia repair, ventral hernia repair Meds: Aspirin 81mg  Select MDx 11/06/2023 Elevated risk: [place] score (GS)>7  PET/CT Hx: Psma whole body 01/25/2024- 1. A few foci of uptake in the posterior base to mid prostate bilaterally, SUV max 5.3, most likely representing known malignancy. 2. Mildly DCFPyL-avid normal-sized left external iliac node, indeterminate in nature. Given lack of other sites concerning for metastatic disease, favor benign.  PSA Hx: 7.85 12/04/2023 7.0 09/21/2023 4.20 08/15/2022  CT Hx: CT abdomen pelvis 05/15/2023- Multiple subcentimeter renal cortical hypodensities bilaterally are to small characterize, and are statistically most likely represent simple cysts, and are stable compared to 6/18/2020. If further evaluation is clinically warranted to evaluate a reported renal mass identified on outside ultrasound which is not been submitted for comparison, a follow up ultrasound or pre and postcontrast MR of the abdomen would be suggested as clinically warranted.  MRI Hx MRI at [facility] on 11/14/2023. 86cc prostate No MRI targetable lesions. Extruded BPH into the left peripheral zone. PIRADS 2 - Low (clinically significant cancer is unlikely to be present). No LAD No EPE, No Bony Lesions. The images have been reviewed and clinical implications discussed with the patient. on review, there is a left mid pzpl lesion image #22  Biopsy Hx 01/18/2024 with  -Left posterior apex: ([place] score 4+4=8), involving 40% of biopsy material. -Left mid PZPL: ([place] score 4+4=8), involving 40%, 30% and 10% of biopsy material.  06/15/2023 - benign (Dr. Avila) - 12 core (non-targeted)  02/12/2024 IPSS     QOL MED  12/04/2023 IPSS 0 QOL 0 MED NR  The patient denies fevers, chills, nausea and or vomiting and no unexplained weight loss.  All pertinent laboratory, films and physician notes were reviewed. Questionnaire results were discussed with patient.  Greater than 50% of this 40 minute visit was spent discussing the options for treatment and management of the patient's localized prostate cancer.  Discussion with the patient: Risks/Benefits/FDA recommendations for prostate cancer screening, the natural history of prostate cancer, the concept of "competing risks", options for treatment including active surveillance, open and laparoscopic (Robotic) radical prostatectomy, external beam radiation therapy, interstitial brachytherapy ("seeds"), combined therapies, hormonal therapies, orchiectomy, and cryotherapy. The potential side effects, early and delayed risks, and effectiveness of each treatment was discussed. The specific details of this patient's disease and their bearing on the above were discussed. The patient was offered the opportunity to meet with the Radiation Oncologists. The patient asked appropriate contextual questions, indicating a good level of understanding.  We discussed the surgical options for management, including robotic and open prostatectomy. The patient understands that the risks of these procedures include bleeding infection, damage to the rectum and surrounding organs, and ED and urinary incontinence, both of which may be persistent. The advantages include removing the entire prostate for more accurate pathologic staging. There is more blood loss with the open approach than with the laparoscopic approach.  We also discussed the options of radiation (external beam, stereotactic body radiation therapy, brachytherapy or combination therapies). Definitive brachytherapy at our institution is done with Pd-103 either as monotherapy or in combination with external beam radiation (Dependent on risk). The patient understands that the risks of radiation include increased LUTS, diarrhea, hematuria, difficulty urinating, ED, and urinary frequency. Based upon the patient's PSA/pathology and risk stratification, the patient would need androgen deprivation in the neoadjuvant and adjuvant setting if he chooses a radiation monotherapy.  We also discussed active surveillance. The patient understands that this is not a treatment but a way of monitoring potentially indolent disease. The patient understands that this has minimal side effects but there is a risk of disease progression.  We have discussed the use of focal therapy and cancer control. The goal be to treat the high-risk area and follow the low-risk areas which are not clinically significant. I describe the difference between whole gland therapy and focal therapy with respect to cancerous control versus cancer cure. We talked about the risks of focal therapy damage to adjacent structures; however, the probability of urinary incontinence and erectile dysfunction is lower.  The patient and I discussed all of these options as well as their risks and benefits, and I believe I answered his questions. Based on the patient's disease characteristics, I feel the patient would be a good candidate for surgery or radiation, we discussed given lesion was focal 5 mm and extremely we circumscribed.  The patient decided to consider focal and will follow up with Dr. Avila as well to discuss. PET CT is negative for METS.

## 2024-02-13 ENCOUNTER — TRANSCRIPTION ENCOUNTER (OUTPATIENT)
Age: 69
End: 2024-02-13

## 2024-02-13 LAB
ANION GAP SERPL CALC-SCNC: 14 MMOL/L
APPEARANCE: CLEAR
BACTERIA: NEGATIVE /HPF
BILIRUBIN URINE: NEGATIVE
BLOOD URINE: NEGATIVE
BUN SERPL-MCNC: 13 MG/DL
CALCIUM SERPL-MCNC: 10.2 MG/DL
CAST: 0 /LPF
CHLORIDE SERPL-SCNC: 104 MMOL/L
CO2 SERPL-SCNC: 26 MMOL/L
COLOR: YELLOW
CREAT SERPL-MCNC: 0.83 MG/DL
EGFR: 95 ML/MIN/1.73M2
EPITHELIAL CELLS: 0 /HPF
GLUCOSE QUALITATIVE U: NEGATIVE MG/DL
GLUCOSE SERPL-MCNC: 95 MG/DL
HCT VFR BLD CALC: 46.4 %
HGB BLD-MCNC: 14.7 G/DL
KETONES URINE: NEGATIVE MG/DL
LEUKOCYTE ESTERASE URINE: NEGATIVE
MCHC RBC-ENTMCNC: 29.6 PG
MCHC RBC-ENTMCNC: 31.7 GM/DL
MCV RBC AUTO: 93.4 FL
MICROSCOPIC-UA: NORMAL
NITRITE URINE: NEGATIVE
PH URINE: 7.5
PLATELET # BLD AUTO: 188 K/UL
POTASSIUM SERPL-SCNC: 4.6 MMOL/L
PROTEIN URINE: NEGATIVE MG/DL
RBC # BLD: 4.97 M/UL
RBC # FLD: 14.4 %
RED BLOOD CELLS URINE: 0 /HPF
SODIUM SERPL-SCNC: 144 MMOL/L
SPECIFIC GRAVITY URINE: 1.01
UROBILINOGEN URINE: 0.2 MG/DL
WBC # FLD AUTO: 6.5 K/UL
WHITE BLOOD CELLS URINE: 0 /HPF

## 2024-02-14 LAB — BACTERIA UR CULT: NORMAL

## 2024-03-13 ENCOUNTER — TRANSCRIPTION ENCOUNTER (OUTPATIENT)
Age: 69
End: 2024-03-13

## 2024-03-13 NOTE — ASU PATIENT PROFILE, ADULT - NS PREOP UNDERSTANDS INFO
No solid food/dairy/candy/gum after midnight; water allowed before 05:30am tomorrow; patient reminder to come with photo ID/insurance/credit card; dress in comfortable clothes; no jewelries/contact lens/valuable; no smoking/alcohol drinking/recreational drug use today; escort to have photo ID; address and callback number was given/yes

## 2024-03-13 NOTE — ASU PATIENT PROFILE, ADULT - NSICDXPASTMEDICALHX_GEN_ALL_CORE_FT
PAST MEDICAL HISTORY:  Afib     BPH with elevated PSA     Cancer of prostate     COVID-19 virus infection February 2020    HLD (hyperlipidemia)     HTN (hypertension)     Lung nodules     Right inguinal hernia     Umbilical hernia

## 2024-03-13 NOTE — ASU PATIENT PROFILE, ADULT - VISION (WITH CORRECTIVE LENSES IF THE PATIENT USUALLY WEARS THEM):
reader/Partially impaired: cannot see medication labels or newsprint, but can see obstacles in path, and the surrounding layout; can count fingers at arm's length

## 2024-03-13 NOTE — ASU PATIENT PROFILE, ADULT - NSICDXPASTSURGICALHX_GEN_ALL_CORE_FT
PAST SURGICAL HISTORY:  H/O cataract extraction bilateral    H/O prostate biopsy     History of hemorrhoidectomy     S/P hernia repair umbilical, inguinal    Status cardiac pacemaker

## 2024-03-14 ENCOUNTER — OUTPATIENT (OUTPATIENT)
Dept: OUTPATIENT SERVICES | Facility: HOSPITAL | Age: 69
LOS: 1 days | Discharge: HOME | End: 2024-03-14
Payer: MEDICARE

## 2024-03-14 ENCOUNTER — TRANSCRIPTION ENCOUNTER (OUTPATIENT)
Age: 69
End: 2024-03-14

## 2024-03-14 ENCOUNTER — APPOINTMENT (OUTPATIENT)
Dept: UROLOGY | Facility: AMBULATORY SURGERY CENTER | Age: 69
End: 2024-03-14

## 2024-03-14 VITALS
SYSTOLIC BLOOD PRESSURE: 107 MMHG | RESPIRATION RATE: 12 BRPM | OXYGEN SATURATION: 98 % | TEMPERATURE: 97 F | DIASTOLIC BLOOD PRESSURE: 62 MMHG | HEART RATE: 60 BPM

## 2024-03-14 VITALS
SYSTOLIC BLOOD PRESSURE: 123 MMHG | RESPIRATION RATE: 16 BRPM | HEIGHT: 68 IN | TEMPERATURE: 97 F | WEIGHT: 153.66 LBS | HEART RATE: 65 BPM | OXYGEN SATURATION: 100 % | DIASTOLIC BLOOD PRESSURE: 75 MMHG

## 2024-03-14 DIAGNOSIS — Z98.49 CATARACT EXTRACTION STATUS, UNSPECIFIED EYE: Chronic | ICD-10-CM

## 2024-03-14 DIAGNOSIS — Z98.890 OTHER SPECIFIED POSTPROCEDURAL STATES: Chronic | ICD-10-CM

## 2024-03-14 DIAGNOSIS — Z95.0 PRESENCE OF CARDIAC PACEMAKER: Chronic | ICD-10-CM

## 2024-03-14 PROCEDURE — 55873 CRYOABLATE PROSTATE: CPT

## 2024-03-14 DEVICE — GWIRE AMPLATZ SUPER STIFF .035INX145CM: Type: IMPLANTABLE DEVICE | Status: FUNCTIONAL

## 2024-03-14 RX ORDER — LOSARTAN POTASSIUM 100 MG/1
1 TABLET, FILM COATED ORAL
Refills: 0 | DISCHARGE

## 2024-03-14 RX ORDER — ROSUVASTATIN CALCIUM 5 MG/1
1 TABLET ORAL
Qty: 0 | Refills: 0 | DISCHARGE

## 2024-03-14 RX ORDER — PYRIDOXINE HCL (VITAMIN B6) 100 MG
1 TABLET ORAL
Qty: 0 | Refills: 0 | DISCHARGE

## 2024-03-14 RX ORDER — LIDOCAINE HCL 20 MG/ML
10 VIAL (ML) INJECTION ONCE
Refills: 0 | Status: COMPLETED | OUTPATIENT
Start: 2024-03-14 | End: 2024-03-14

## 2024-03-14 RX ORDER — FENTANYL CITRATE 50 UG/ML
50 INJECTION INTRAVENOUS
Refills: 0 | Status: DISCONTINUED | OUTPATIENT
Start: 2024-03-14 | End: 2024-03-14

## 2024-03-14 RX ORDER — ASCORBIC ACID 60 MG
1 TABLET,CHEWABLE ORAL
Qty: 0 | Refills: 0 | DISCHARGE

## 2024-03-14 RX ORDER — ROSUVASTATIN CALCIUM 5 MG/1
1 TABLET ORAL
Refills: 0 | DISCHARGE

## 2024-03-14 RX ORDER — LOSARTAN POTASSIUM 100 MG/1
1 TABLET, FILM COATED ORAL
Qty: 0 | Refills: 0 | DISCHARGE

## 2024-03-14 RX ORDER — ACETAMINOPHEN 500 MG
2 TABLET ORAL
Refills: 0 | DISCHARGE

## 2024-03-14 RX ORDER — CHOLECALCIFEROL (VITAMIN D3) 125 MCG
1 CAPSULE ORAL
Qty: 0 | Refills: 0 | DISCHARGE

## 2024-03-14 RX ORDER — DIAZEPAM 5 MG
2.5 TABLET ORAL
Refills: 0 | Status: DISCONTINUED | OUTPATIENT
Start: 2024-03-14 | End: 2024-03-14

## 2024-03-14 RX ORDER — ASPIRIN/CALCIUM CARB/MAGNESIUM 324 MG
1 TABLET ORAL
Qty: 0 | Refills: 0 | DISCHARGE

## 2024-03-14 RX ORDER — FENTANYL CITRATE 50 UG/ML
25 INJECTION INTRAVENOUS
Refills: 0 | Status: DISCONTINUED | OUTPATIENT
Start: 2024-03-14 | End: 2024-03-14

## 2024-03-14 RX ORDER — SODIUM CHLORIDE 9 MG/ML
500 INJECTION, SOLUTION INTRAVENOUS
Refills: 0 | Status: DISCONTINUED | OUTPATIENT
Start: 2024-03-14 | End: 2024-03-14

## 2024-03-14 RX ORDER — ONDANSETRON 8 MG/1
4 TABLET, FILM COATED ORAL ONCE
Refills: 0 | Status: DISCONTINUED | OUTPATIENT
Start: 2024-03-14 | End: 2024-03-14

## 2024-03-14 RX ORDER — OXYBUTYNIN CHLORIDE 5 MG
10 TABLET ORAL ONCE
Refills: 0 | Status: COMPLETED | OUTPATIENT
Start: 2024-03-14 | End: 2024-03-14

## 2024-03-14 RX ORDER — ACETAMINOPHEN 500 MG
1000 TABLET ORAL ONCE
Refills: 0 | Status: COMPLETED | OUTPATIENT
Start: 2024-03-14 | End: 2024-03-14

## 2024-03-14 RX ADMIN — FENTANYL CITRATE 25 MICROGRAM(S): 50 INJECTION INTRAVENOUS at 12:17

## 2024-03-14 RX ADMIN — Medication 10 MILLIGRAM(S): at 11:00

## 2024-03-14 RX ADMIN — Medication 1000 MILLIGRAM(S): at 07:26

## 2024-03-14 RX ADMIN — FENTANYL CITRATE 25 MICROGRAM(S): 50 INJECTION INTRAVENOUS at 12:02

## 2024-03-14 RX ADMIN — Medication 2.5 MILLIGRAM(S): at 10:59

## 2024-03-14 RX ADMIN — Medication 10 MILLILITER(S): at 10:59

## 2024-03-14 RX ADMIN — Medication 2.5 MILLIGRAM(S): at 11:20

## 2024-03-14 NOTE — PRE-ANESTHESIA EVALUATION ADULT - BSA (M2)
Scheduled first Xolair injection at [facility] on 2/11/21. Patient aware she needs to bring her epipen and be prepared to wait 2 hours in clinic.  Gave patient phone number for [facility] pharmacy.    1.83

## 2024-03-14 NOTE — ASU DISCHARGE PLAN (ADULT/PEDIATRIC) - ASU DC SPECIAL INSTRUCTIONSFT
You may experience mild pain at the site of the procedure. You can shower, but please keep area dry afterwards and reapply bacitracin and new bandage if needed. You may also experience blood in your urine, stool, and during ejaculation which will clear up in a few days. This is expected. You may take over-the-counter medication such as Tylenol for pain, but do not exceed 4,000 mg of Tylenol daily.    Follow up with Dr. Turk's office for catheter removal.    You have been prescribed Bactrim (an antibiotic) for three days.  It was sent to your preferred pharmacy. You may experience mild pain at the site of the procedure. You can shower, but please keep area dry afterwards and reapply bacitracin and new bandage if needed. You may also experience blood in your urine, stool, and during ejaculation which will clear up in a few days. This is expected. You may take over-the-counter medication such as Tylenol for pain, but do not exceed 4,000 mg of Tylenol daily.    Follow up with Dr. Turk's office for catheter removal.    You have been prescribed Bactrim (an antibiotic) for three days.  It was sent to your preferred pharmacy [facility].

## 2024-03-14 NOTE — ASU DISCHARGE PLAN (ADULT/PEDIATRIC) - CARE PROVIDER_API CALL
Alison Turk  Urology  [address] [place] [address]  Phone: [phone]  Fax: [phone]  Follow Up Time:

## 2024-03-14 NOTE — ASU DISCHARGE PLAN (ADULT/PEDIATRIC) - NS MD DC FALL RISK RISK
For information on Fall & Injury Prevention, visit: [email or web address] OR  [email or web address] OR  https://www.cdc.gov/steadi/patient.html

## 2024-03-15 ENCOUNTER — NON-APPOINTMENT (OUTPATIENT)
Age: 69
End: 2024-03-15

## 2024-03-18 PROBLEM — C61 MALIGNANT NEOPLASM OF PROSTATE: Chronic | Status: ACTIVE | Noted: 2024-03-13

## 2024-04-15 ENCOUNTER — APPOINTMENT (OUTPATIENT)
Dept: UROLOGY | Facility: CLINIC | Age: 69
End: 2024-04-15
Payer: MEDICARE

## 2024-04-15 VITALS
TEMPERATURE: 98.3 F | HEART RATE: 70 BPM | DIASTOLIC BLOOD PRESSURE: 77 MMHG | SYSTOLIC BLOOD PRESSURE: 120 MMHG | OXYGEN SATURATION: 98 %

## 2024-04-15 PROCEDURE — 99024 POSTOP FOLLOW-UP VISIT: CPT

## 2024-04-15 RX ORDER — TADALAFIL 5 MG/1
5 TABLET ORAL
Qty: 90 | Refills: 1 | Status: ACTIVE | COMMUNITY
Start: 2024-04-15 | End: 1900-01-01

## 2024-04-15 NOTE — PHYSICAL EXAM
[] : no respiratory distress [No Focal Deficits] : no focal deficits [Normal] : oriented to person, place and time, the affect was normal, the mood was normal and not anxious [Normal Penis, Circumcised] : normal circumcised penis [Normal Urethral Meatus] : urethral meatus normal [FreeTextEntry1] : 67 yo M who presents with elevated PSA. Standard TRUS biopsy was negative (6/15/23). Pt has no family history, has not had any imaging (pacemaker). s/p focal ablation 3/14/24  Prostate Cancer (4+4=8) Space OAR and Cryo (3/14/24) - 7.0 (9/21/23) < 4.20 (8/15/22) - - MRI (pace-maker protocol) read as negative; however, we added a small left mid pzpm lesion that was ultimately positive for GG4 CaP. - PSMA negative  Urinary incontinence (post void dribble) -  milk urethra - PVR 4/15/24 6 cc  Constipation - start colace or metamucil  - increase water intake   Thank you very much for allowing me to assist in the care of this patient. Please do not hesitate to contact me with any additional questions or concerns.      Sincerely,     Wing Turk D.O. Professor of Urology and Radiology  of Urology at [facility] Director for Prostate Cancer [address] [place] [address], [address] [place] [address] Phone: [phone]

## 2024-04-15 NOTE — HISTORY OF PRESENT ILLNESS
[FreeTextEntry1] : Dear Dr. Avila,   Thank you so much for the referral to help care for your patient.  Chief Complaint:  Prostate Cancer (4+4=8) Space OAR and Cryo (3/14/24) Date of first visit: 11/06/2023  RICKI SALAZAR is a 68 year old man who presents post cryoalbation/spaceOAR 3/14/24. for new diagnosis of GG4 prostate cancer. He underwent standard TRUS biopsy (Dr. Avila) which was benign. No family history of prostate cancer. The patient denies any bothersome urinary symptoms, nocturia 1x, not taking any medications at this time. He has never had any dedicated prostate imaging, he has a pacemaker (placed 1-year ago, and was told cannot undergo MRI).  Patient reports some urinary incontinence that started about a week after procedure, wears 3-4 pull ups, but changes frequently for sanitary purposes only. Pads are damp, not saturated.  Still Notes small amount of blood at beginning of urinary stream.  Denies fevers and chills.   Pacemaker - [place] XT DR MARTIN SureScan Pacemaker (MR Conditional product). Implanted 10-Hansle-2023; Serial # [id number], Model: 5076-58/W1DR01 Overall doing well post-biopsy, some noted hematuria; however, this is improving. Biopsy came back [place] 4+4=8, PSMA PET ordered which was overall negative for metastasis, indeterminate external iliac node (favor benign).  PMH: HTN, Hyperlipidemia, Bradycardia PSH: Pacemaker placement, cataract surgery, right inguinal hernia repair, ventral hernia repair Meds: Aspirin 81mg  Select MDx 11/06/2023 Elevated risk: [place] score (GS)>7  PET/CT Hx: Psma whole body 01/25/2024- 1. A few foci of uptake in the posterior base to mid prostate bilaterally, SUV max 5.3, most likely representing known malignancy. 2. Mildly DCFPyL-avid normal-sized left external iliac node, indeterminate in nature. Given lack of other sites concerning for metastatic disease, favor benign.  PSA Hx: 7.85 12/04/2023 7.0 09/21/2023 4.20 08/15/2022  CT Hx: CT abdomen pelvis 05/15/2023- Multiple subcentimeter renal cortical hypodensities bilaterally are to small characterize, and are statistically most likely represent simple cysts, and are stable compared to 6/18/2020. If further evaluation is clinically warranted to evaluate a reported renal mass identified on outside ultrasound which is not been submitted for comparison, a follow up ultrasound or pre and postcontrast MR of the abdomen would be suggested as clinically warranted.  MRI Hx MRI at [facility] on 11/14/2023. 86cc prostate No MRI targetable lesions. Extruded BPH into the left peripheral zone. PIRADS 2 - Low (clinically significant cancer is unlikely to be present). No LAD No EPE, No Bony Lesions. The images have been reviewed and clinical implications discussed with the patient. on review, there is a left mid pzpl lesion image #22  Biopsy Hx 01/18/2024 with  -Left posterior apex: ([place] score 4+4=8), involving 40% of biopsy material. -Left mid PZPL: ([place]eline score 4+4=8), involving 40%, 30% and 10% of biopsy material.  06/15/2023 - benign (Dr. Avila) - 12 core (non-targeted)  04/15/2024 IPSS     21  QOL  6 MED  19  02/12/2024 IPSS QOL MED  12/04/2023 IPSS 0 QOL 0 MED NR  The patient denies fevers, chills, nausea and or vomiting and no unexplained weight loss.  All pertinent laboratory, films and physician notes were reviewed. Questionnaire results were discussed with patient.

## 2024-04-15 NOTE — ADDENDUM
[FreeTextEntry1] :   I, Dr. Turk, personally performed the evaluation and management (E/M) services for this established patient who presents today with (a) new problem(s)/exacerbation of (an) existing condition(s).  That E/M includes conducting the examination, assessing all new/exacerbated conditions, and establishing a new plan of care.  Today, my ACP, Mackenzie Helton, ANP-BC, was here to observe my evaluation and management services for this new problem/exacerbated condition to be followed going forward.

## 2024-04-16 ENCOUNTER — TRANSCRIPTION ENCOUNTER (OUTPATIENT)
Age: 69
End: 2024-04-16

## 2024-04-16 LAB
APPEARANCE: CLEAR
BACTERIA: NEGATIVE /HPF
BILIRUBIN URINE: NEGATIVE
BLOOD URINE: ABNORMAL
CAST: 0 /LPF
COLOR: YELLOW
EPITHELIAL CELLS: 0 /HPF
GLUCOSE QUALITATIVE U: NEGATIVE MG/DL
KETONES URINE: NEGATIVE MG/DL
LEUKOCYTE ESTERASE URINE: NEGATIVE
MICROSCOPIC-UA: NORMAL
NITRITE URINE: NEGATIVE
PH URINE: 8
PROTEIN URINE: NORMAL MG/DL
PSA FREE FLD-MCNC: 14 %
PSA FREE SERPL-MCNC: 0.7 NG/ML
PSA SERPL-MCNC: 5.1 NG/ML
RED BLOOD CELLS URINE: 48 /HPF
SPECIFIC GRAVITY URINE: 1.02
UROBILINOGEN URINE: 0.2 MG/DL
WHITE BLOOD CELLS URINE: 3 /HPF

## 2024-04-17 LAB — BACTERIA UR CULT: NORMAL

## 2024-04-29 NOTE — H&P PST ADULT - EYES
Bed: O32  Expected date:   Expected time:   Means of arrival: [facility] (488)  Comments:  89F AO4 GCS 15; pt post COVID x 2 weeks and hasn't improved since; pt was 84% on RA now 94% on 6L NC;  118/54 84 34 with retractions;    not examined

## 2024-06-24 ENCOUNTER — NON-APPOINTMENT (OUTPATIENT)
Age: 69
End: 2024-06-24

## 2024-06-24 ENCOUNTER — APPOINTMENT (OUTPATIENT)
Dept: UROLOGY | Facility: CLINIC | Age: 69
End: 2024-06-24
Payer: MEDICARE

## 2024-06-24 VITALS
TEMPERATURE: 98.3 F | OXYGEN SATURATION: 98 % | SYSTOLIC BLOOD PRESSURE: 112 MMHG | HEART RATE: 77 BPM | DIASTOLIC BLOOD PRESSURE: 77 MMHG

## 2024-06-24 DIAGNOSIS — R35.0 FREQUENCY OF MICTURITION: ICD-10-CM

## 2024-06-24 DIAGNOSIS — C61 MALIGNANT NEOPLASM OF PROSTATE: ICD-10-CM

## 2024-06-24 PROCEDURE — 99213 OFFICE O/P EST LOW 20 MIN: CPT

## 2024-06-24 NOTE — HISTORY OF PRESENT ILLNESS
[FreeTextEntry1] : Dear Dr. Avila,  Thank you so much for the referral to help care for your patient.  Chief Complaint: Prostate Cancer (4+4=8) Space OAR and Cryo (3/14/24) Date of first visit: 11/06/2023  RICKI SALAZAR is a 68 year old man who presents post cryoalbation/spaceOAR 3/14/24. for new diagnosis of GG4 prostate cancer. He underwent standard TRUS biopsy (Dr. Avila) which was benign. No family history of prostate cancer. The patient denies any bothersome urinary symptoms, nocturia 1x, not taking any medications at this time. He has never had any dedicated prostate imaging, he has a pacemaker (placed 1-year ago, and was told cannot undergo MRI).  Patient reports some urinary incontinence that started about a week after procedure, wears 3-4 pull ups, but changes frequently for sanitary purposes only. Pads are damp, not saturated. Still Notes small amount of blood at beginning of urinary stream. Denies fevers and chills.  Pacemaker - [place] XT DR MARTIN SureScan Pacemaker (MR Conditional product). Implanted 10-Hansel-2023; Serial # [id number], Model: 5076-58/W1DR01 Overall doing well post-biopsy, some noted hematuria; however, this is improving. Biopsy came back [place] 4+4=8, PSMA PET ordered which was overall negative for metastasis, indeterminate external iliac node (favor benign).  PMH: HTN, Hyperlipidemia, Bradycardia PSH: Pacemaker placement, cataract surgery, right inguinal hernia repair, ventral hernia repair Meds: Aspirin 81mg  Select MDx 11/06/2023 Elevated risk: [place] score (GS)>7  PET/CT Hx: Psma whole body 01/25/2024- 1. A few foci of uptake in the posterior base to mid prostate bilaterally, SUV max 5.3, most likely representing known malignancy. 2. Mildly DCFPyL-avid normal-sized left external iliac node, indeterminate in nature. Given lack of other sites concerning for metastatic disease, favor benign.  PSA Hx: 5.10 4/15/24 focal cryo 3/14/24 7.85 12/04/2023 7.0 09/21/2023 4.20 08/15/2022  CT Hx: CT abdomen pelvis 05/15/2023- Multiple subcentimeter renal cortical hypodensities bilaterally are too small characterize, and are statistically most likely represent simple cysts, and are stable compared to 6/18/2020. If further evaluation is clinically warranted to evaluate a reported renal mass identified on outside ultrasound which is not been submitted for comparison, a follow up ultrasound or pre and postcontrast MR of the abdomen would be suggested as clinically warranted.  MRI Hx MRI at [facility] on 11/14/2023. 86cc prostate No MRI targetable lesions. Extruded BPH into the left peripheral zone. PIRADS 2 - Low (clinically significant cancer is unlikely to be present). No LAD No EPE, No Bony Lesions. The images have been reviewed and clinical implications discussed with the patient. on review, there is a left mid pzpl lesion image #22  Biopsy Hx 01/18/2024 with  -Left posterior apex: (Celine score 4+4=8), involving 40% of biopsy material. -Left mid PZPL: ([place] score 4+4=8), involving 40%, 30% and 10% of biopsy material.  06/15/2023 - benign (Dr. Avila) - 12 core (non-targeted)  06/24/2024 IPSS   6     QOL 2 MDE  2- not sexually active   04/15/2024 IPSS 21 QOL 6 MED 19  12/04/2023 IPSS 0 QOL 0 MED NR  The patient denies fevers, chills, nausea and or vomiting and no unexplained weight loss.  All pertinent laboratory, films and physician notes were reviewed. Questionnaire results were discussed with patient.

## 2024-06-24 NOTE — PHYSICAL EXAM
[General Appearance - In No Acute Distress] : in no acute distress [] : no respiratory distress [Abdomen Soft] : soft [Abdomen Tenderness] : non-tender [No Focal Deficits] : no focal deficits [Oriented To Time, Place, And Person] : oriented to person, place, and time [de-identified] : no CVA tenderness [de-identified] : no leg edema [FreeTextEntry1] : 67 yo M who presents with elevated PSA. Standard TRUS biopsy was negative (6/15/23). Pt has no family history, has not had any imaging (pacemaker). s/p focal ablation 3/14/24  Prostate Cancer (4+4=8) Space OAR and Cryo (3/14/24) - 7.0 (9/21/23) < 4.20 (8/15/22) - - MRI (pace-maker protocol) read as negative; however, we added a small left mid pzpm lesion that was ultimately positive for GG4 CaP. - PSMA negative - check psa 6/24/24 - repeat prostate MRI and biopsy in 3/2025    Urinary incontinence (post void dribble) - resolved  Constipation - resolved  bilateral lower extremity muscle pain -stop cialis 5mg   RTC for 6-month post op visit (9/2024) with SONYA Mann.  Thank you very much for allowing me to assist in the care of this patient. Please do not hesitate to contact me with any additional questions or concerns.      Sincerely,     Wing Turk D.O. Professor of Urology and Radiology  of Urology at [facility] Director for Prostate Cancer [address], [address] [facility], [facility] [place][note 2] Phone: [phone]

## 2024-06-26 LAB
PSA FREE FLD-MCNC: 12 %
PSA FREE SERPL-MCNC: 0.69 NG/ML
PSA SERPL-MCNC: 5.7 NG/ML

## 2024-06-27 ENCOUNTER — NON-APPOINTMENT (OUTPATIENT)
Age: 69
End: 2024-06-27

## 2024-07-30 ENCOUNTER — NON-APPOINTMENT (OUTPATIENT)
Age: 69
End: 2024-07-30

## 2024-09-10 ENCOUNTER — TRANSCRIPTION ENCOUNTER (OUTPATIENT)
Age: 69
End: 2024-09-10

## 2024-09-26 ENCOUNTER — APPOINTMENT (OUTPATIENT)
Dept: UROLOGY | Facility: CLINIC | Age: 69
End: 2024-09-26
Payer: MEDICARE

## 2024-09-26 VITALS
HEART RATE: 74 BPM | OXYGEN SATURATION: 98 % | HEIGHT: 66 IN | DIASTOLIC BLOOD PRESSURE: 77 MMHG | BODY MASS INDEX: 24.43 KG/M2 | WEIGHT: 152 LBS | SYSTOLIC BLOOD PRESSURE: 113 MMHG | TEMPERATURE: 98.5 F

## 2024-09-26 DIAGNOSIS — C61 MALIGNANT NEOPLASM OF PROSTATE: ICD-10-CM

## 2024-09-26 PROCEDURE — 99213 OFFICE O/P EST LOW 20 MIN: CPT

## 2024-09-26 NOTE — HISTORY OF PRESENT ILLNESS
[FreeTextEntry1] : Dear Dr. Avila,  Thank you so much for the referral to help care for your patient.  Chief Complaint: Prostate Cancer (4+4=8) Space OAR and Cryo (3/14/24) Date of first visit: 11/06/2023  RICKI SALAZAR is a 68 year old man who presents post cryoalbation/spaceOAR 3/14/24. for new diagnosis of GG4 prostate cancer. He underwent standard TRUS biopsy (Dr. Avila) which was benign. No family history of prostate cancer. The patient denies any bothersome urinary symptoms, nocturia 1x, not taking any medications at this time. He has never had any dedicated prostate imaging, he has a pacemaker (placed 1-year ago, and was told cannot undergo MRI).  Patient reports some urinary incontinence that started about a week after procedure, wears 3-4 pull ups, but changes frequently for sanitary purposes only. Pads are damp, not saturated. Still Notes small amount of blood at beginning of urinary stream. Denies fevers and chills.  Pacemaker - [place] XT DR MARTIN SureScan Pacemaker (MR Conditional product). Implanted 10-Hansel-2023; Serial # [id number], Model: 5076-58/W1DR01 Overall doing well post-biopsy, some noted hematuria; however, this is improving. Biopsy came back [place] 4+4=8, PSMA PET ordered which was overall negative for metastasis, indeterminate external iliac node (favor benign).  PMH: HTN, Hyperlipidemia, Bradycardia PSH: Pacemaker placement, cataract surgery, right inguinal hernia repair, ventral hernia repair Meds: Aspirin 81mg  Select MDx 11/06/2023 Elevated risk: [place] score (GS)>7  PET/CT Hx: Psma whole body 01/25/2024- 1. A few foci of uptake in the posterior base to mid prostate bilaterally, SUV max 5.3, most likely representing known malignancy. 2. Mildly DCFPyL-avid normal-sized left external iliac node, indeterminate in nature. Given lack of other sites concerning for metastatic disease, favor benign.  PSA Hx: 5.61 7/24/24 5.70 6/24/24 5.10 4/15/24 focal cryo 3/14/24 7.85 12/04/2023 7.0 09/21/2023 4.20 08/15/2022  CT Hx: CT abdomen pelvis 05/15/2023- Multiple subcentimeter renal cortical hypodensities bilaterally are too small characterize, and are statistically most likely represent simple cysts, and are stable compared to 6/18/2020. If further evaluation is clinically warranted to evaluate a reported renal mass identified on outside ultrasound which is not been submitted for comparison, a follow up ultrasound or pre and postcontrast MR of the abdomen would be suggested as clinically warranted.  MRI Hx MRI at [facility] on 11/14/2023. 86cc prostate No MRI targetable lesions. Extruded BPH into the left peripheral zone. PIRADS 2 - Low (clinically significant cancer is unlikely to be present). No LAD No EPE, No Bony Lesions. The images have been reviewed and clinical implications discussed with the patient. on review, there is a left mid pzpl lesion image #22  Biopsy Hx 01/18/2024 with  -Left posterior apex: ([place] score 4+4=8), involving 40% of biopsy material. -Left mid PZPL: ([place] score 4+4=8), involving 40%, 30% and 10% of biopsy material.  06/15/2023 - benign (Dr. Avila) - 12 core (non-targeted)  06/24/2024 IPSS 6 QOL 2 MED 2- not sexually active  04/15/2024 IPSS 21 QOL 6 MED 19  12/04/2023 IPSS 0 QOL 0 MED NR  The patient denies fevers, chills, nausea and or vomiting and no unexplained weight loss.  All pertinent laboratory, films and physician notes were reviewed. Questionnaire results were discussed with patient.

## 2024-09-30 ENCOUNTER — TRANSCRIPTION ENCOUNTER (OUTPATIENT)
Age: 69
End: 2024-09-30

## 2024-09-30 LAB
PSA FREE FLD-MCNC: 13 %
PSA FREE SERPL-MCNC: 1.01 NG/ML
PSA SERPL-MCNC: 7.93 NG/ML

## 2024-10-23 ENCOUNTER — APPOINTMENT (OUTPATIENT)
Dept: MRI IMAGING | Facility: HOSPITAL | Age: 69
End: 2024-10-23
Payer: MEDICARE

## 2024-10-23 ENCOUNTER — OUTPATIENT (OUTPATIENT)
Dept: OUTPATIENT SERVICES | Facility: HOSPITAL | Age: 69
LOS: 1 days | End: 2024-10-23
Payer: MEDICARE

## 2024-10-23 DIAGNOSIS — C61 MALIGNANT NEOPLASM OF PROSTATE: ICD-10-CM

## 2024-10-23 DIAGNOSIS — Z98.890 OTHER SPECIFIED POSTPROCEDURAL STATES: Chronic | ICD-10-CM

## 2024-10-23 DIAGNOSIS — R97.20 ELEVATED PROSTATE SPECIFIC ANTIGEN [PSA]: ICD-10-CM

## 2024-10-23 DIAGNOSIS — Z98.49 CATARACT EXTRACTION STATUS, UNSPECIFIED EYE: Chronic | ICD-10-CM

## 2024-10-23 DIAGNOSIS — Z95.0 PRESENCE OF CARDIAC PACEMAKER: Chronic | ICD-10-CM

## 2024-10-23 PROCEDURE — 71046 X-RAY EXAM CHEST 2 VIEWS: CPT | Mod: 26

## 2024-10-23 PROCEDURE — 72197 MRI PELVIS W/O & W/DYE: CPT | Mod: 26,MH

## 2024-11-18 ENCOUNTER — NON-APPOINTMENT (OUTPATIENT)
Age: 69
End: 2024-11-18

## 2024-11-18 ENCOUNTER — APPOINTMENT (OUTPATIENT)
Dept: UROLOGY | Facility: CLINIC | Age: 69
End: 2024-11-18
Payer: MEDICARE

## 2024-11-18 VITALS
HEART RATE: 84 BPM | DIASTOLIC BLOOD PRESSURE: 86 MMHG | RESPIRATION RATE: 18 BRPM | TEMPERATURE: 98 F | SYSTOLIC BLOOD PRESSURE: 129 MMHG

## 2024-11-18 DIAGNOSIS — C61 MALIGNANT NEOPLASM OF PROSTATE: ICD-10-CM

## 2024-11-18 PROCEDURE — 99214 OFFICE O/P EST MOD 30 MIN: CPT

## 2024-11-20 PROCEDURE — 72197 MRI PELVIS W/O & W/DYE: CPT

## 2024-11-20 PROCEDURE — A9585: CPT

## 2024-11-20 PROCEDURE — 71046 X-RAY EXAM CHEST 2 VIEWS: CPT

## 2025-03-24 ENCOUNTER — APPOINTMENT (OUTPATIENT)
Dept: UROLOGY | Facility: CLINIC | Age: 70
End: 2025-03-24
Payer: MEDICARE

## 2025-03-24 VITALS
HEART RATE: 84 BPM | DIASTOLIC BLOOD PRESSURE: 79 MMHG | BODY MASS INDEX: 24.43 KG/M2 | OXYGEN SATURATION: 97 % | TEMPERATURE: 97 F | HEIGHT: 66 IN | WEIGHT: 152 LBS | SYSTOLIC BLOOD PRESSURE: 138 MMHG

## 2025-03-24 DIAGNOSIS — R35.0 FREQUENCY OF MICTURITION: ICD-10-CM

## 2025-03-24 DIAGNOSIS — C61 MALIGNANT NEOPLASM OF PROSTATE: ICD-10-CM

## 2025-03-24 DIAGNOSIS — R97.20 ELEVATED PROSTATE, SPECIFIC ANTIGEN [PSA]: ICD-10-CM

## 2025-03-24 PROCEDURE — G2211 COMPLEX E/M VISIT ADD ON: CPT

## 2025-03-24 PROCEDURE — 99214 OFFICE O/P EST MOD 30 MIN: CPT

## 2025-03-25 LAB
APPEARANCE: CLEAR
BILIRUBIN URINE: NEGATIVE
BLOOD URINE: NEGATIVE
COLOR: YELLOW
GLUCOSE QUALITATIVE U: NEGATIVE MG/DL
KETONES URINE: NEGATIVE MG/DL
LEUKOCYTE ESTERASE URINE: NEGATIVE
NITRITE URINE: NEGATIVE
PH URINE: 8
PROTEIN URINE: NORMAL MG/DL
SPECIFIC GRAVITY URINE: 1.02
UROBILINOGEN URINE: 0.2 MG/DL

## 2025-03-26 LAB — BACTERIA UR CULT: NORMAL

## 2025-04-02 NOTE — ASU PATIENT PROFILE, ADULT - FALL HARM RISK - FALL HARM RISK
[facility] provides services at a reduced cost to those who are determined to be eligible through [facility]’s financial assistance program. Information regarding [facility]’s financial assistance program can be found by going to [email or web address] or by calling [phone]. No indicators present

## 2025-04-21 ENCOUNTER — APPOINTMENT (OUTPATIENT)
Dept: NUCLEAR MEDICINE | Facility: CLINIC | Age: 70
End: 2025-04-21
Payer: MEDICARE

## 2025-04-21 PROCEDURE — 78816 PET IMAGE W/CT FULL BODY: CPT | Mod: PS

## 2025-04-21 PROCEDURE — A9595: CPT

## 2025-04-24 ENCOUNTER — NON-APPOINTMENT (OUTPATIENT)
Age: 70
End: 2025-04-24

## 2025-04-25 ENCOUNTER — TRANSCRIPTION ENCOUNTER (OUTPATIENT)
Age: 70
End: 2025-04-25

## 2025-04-29 ENCOUNTER — TRANSCRIPTION ENCOUNTER (OUTPATIENT)
Age: 70
End: 2025-04-29

## 2025-05-13 NOTE — ASU PATIENT PROFILE, ADULT - PRO INTERPRETER NEED 2
Medicare Annual Wellness Visit    Solitario Hendrickson is here for Medicare AWV    Assessment & Plan   Assessment & Plan  Diagnoses and all orders for this visit:     Initial Medicare annual wellness visit     Essential hypertension  -     Lipid Panel; Future  -     Comprehensive Metabolic Panel; Future  Stable w/o meds  Compliant with cpap  Mixed hyperlipidemia  Stable on statin  Screening PSA (prostate specific antigen)  -     PSA Screening; Future     Prediabetes  -     Hemoglobin A1C; Future  Stable with diet  No follow-ups on file.     Subjective       Patient's complete Health Risk Assessment and screening values have been reviewed and are found in Flowsheets. The following problems were reviewed today and where indicated follow up appointments were made and/or referrals ordered.    Positive Risk Factor Screenings with Interventions:    Fall Risk:  Do you feel unsteady or are you worried about falling? : (!) yes  2 or more falls in past year?: no  Fall with injury in past year?: no     Interventions:    Reviewed medications, home hazards, visual acuity, and co-morbidities that can increase risk for falls     Depression:  PHQ-2 Score: 0  PHQ-9 Total Score: 6  Total Score Interpretation: 5-9 = mild depression  Interventions:  Patient declines any further evaluation or treatment          General HRA Questions:  Select all that apply: (!) New or Increased Fatigue, Stress  Interventions Fatigue:  See AVS for additional education material  Interventions - Stress:  See AVS for additional education material       Poor Eating Habits/Diet:  Do you eat balanced/healthy meals regularly?: (!) No  Interventions:  low carbohydrate diet    Abnormal BMI (obese):  Body mass index is 31.8 kg/m². (!) Abnormal  Interventions:  low carbohydrate diet                           Objective   Vitals:    05/13/25 1107   BP: 125/84   Pulse: 85   SpO2: 95%   Weight: 89.4 kg (197 lb)   Height: 1.676 m (5' 6\")      Body mass index is 31.8  [place]

## 2025-05-21 ENCOUNTER — OUTPATIENT (OUTPATIENT)
Dept: OUTPATIENT SERVICES | Facility: HOSPITAL | Age: 70
LOS: 1 days | End: 2025-05-21
Payer: MEDICARE

## 2025-05-21 DIAGNOSIS — Z98.49 CATARACT EXTRACTION STATUS, UNSPECIFIED EYE: Chronic | ICD-10-CM

## 2025-05-21 DIAGNOSIS — Z98.890 OTHER SPECIFIED POSTPROCEDURAL STATES: Chronic | ICD-10-CM

## 2025-05-21 DIAGNOSIS — Z95.0 PRESENCE OF CARDIAC PACEMAKER: Chronic | ICD-10-CM

## 2025-05-21 DIAGNOSIS — R97.20 ELEVATED PROSTATE SPECIFIC ANTIGEN [PSA]: ICD-10-CM

## 2025-05-21 PROCEDURE — C8001: CPT

## 2025-05-21 NOTE — ASU PATIENT PROFILE, ADULT - FALL HARM RISK - FALLEN IN PAST
No Per Pt  fell against table at home went to PCP then sent for x ray due C/O  Pain  right side  Told had Fx  rib/Accidental fall

## 2025-05-21 NOTE — ASU PATIENT PROFILE, ADULT - FALL HARM RISK - RISK INTERVENTIONS

## 2025-05-22 ENCOUNTER — APPOINTMENT (OUTPATIENT)
Dept: UROLOGY | Facility: AMBULATORY SURGERY CENTER | Age: 70
End: 2025-05-22

## 2025-05-22 ENCOUNTER — TRANSCRIPTION ENCOUNTER (OUTPATIENT)
Age: 70
End: 2025-05-22

## 2025-05-22 ENCOUNTER — RESULT REVIEW (OUTPATIENT)
Age: 70
End: 2025-05-22

## 2025-05-22 ENCOUNTER — OUTPATIENT (OUTPATIENT)
Dept: OUTPATIENT SERVICES | Facility: HOSPITAL | Age: 70
LOS: 1 days | Discharge: HOME | End: 2025-05-22
Payer: MEDICARE

## 2025-05-22 VITALS
RESPIRATION RATE: 14 BRPM | SYSTOLIC BLOOD PRESSURE: 132 MMHG | OXYGEN SATURATION: 99 % | TEMPERATURE: 98 F | DIASTOLIC BLOOD PRESSURE: 76 MMHG | HEART RATE: 66 BPM

## 2025-05-22 VITALS
HEIGHT: 69 IN | SYSTOLIC BLOOD PRESSURE: 139 MMHG | OXYGEN SATURATION: 98 % | DIASTOLIC BLOOD PRESSURE: 79 MMHG | RESPIRATION RATE: 16 BRPM | TEMPERATURE: 98 F | WEIGHT: 150.36 LBS | HEART RATE: 83 BPM

## 2025-05-22 DIAGNOSIS — Z98.49 CATARACT EXTRACTION STATUS, UNSPECIFIED EYE: Chronic | ICD-10-CM

## 2025-05-22 DIAGNOSIS — Z98.890 OTHER SPECIFIED POSTPROCEDURAL STATES: Chronic | ICD-10-CM

## 2025-05-22 DIAGNOSIS — Z95.0 PRESENCE OF CARDIAC PACEMAKER: Chronic | ICD-10-CM

## 2025-05-22 PROCEDURE — G0416: CPT | Mod: 26

## 2025-05-22 PROCEDURE — 76999F: CUSTOM | Mod: 26

## 2025-05-22 PROCEDURE — 55706 BX PRST8 NDL SAT SAMPLING: CPT

## 2025-05-22 RX ORDER — ONDANSETRON HCL/PF 4 MG/2 ML
4 VIAL (ML) INJECTION EVERY 4 HOURS
Refills: 0 | Status: DISCONTINUED | OUTPATIENT
Start: 2025-05-22 | End: 2025-05-22

## 2025-05-22 RX ORDER — ACETAMINOPHEN 500 MG/5ML
650 LIQUID (ML) ORAL EVERY 6 HOURS
Refills: 0 | Status: DISCONTINUED | OUTPATIENT
Start: 2025-05-22 | End: 2025-05-22

## 2025-05-22 RX ORDER — PHENAZOPYRIDINE HCL 100 MG
100 TABLET ORAL ONCE
Refills: 0 | Status: DISCONTINUED | OUTPATIENT
Start: 2025-05-22 | End: 2025-05-22

## 2025-05-22 RX ORDER — FENTANYL CITRATE-0.9 % NACL/PF 100MCG/2ML
50 SYRINGE (ML) INTRAVENOUS
Refills: 0 | Status: DISCONTINUED | OUTPATIENT
Start: 2025-05-22 | End: 2025-05-22

## 2025-05-22 RX ORDER — TAMSULOSIN HYDROCHLORIDE 0.4 MG/1
1 CAPSULE ORAL
Refills: 0 | DISCHARGE

## 2025-05-22 RX ORDER — FENTANYL CITRATE-0.9 % NACL/PF 100MCG/2ML
25 SYRINGE (ML) INTRAVENOUS
Refills: 0 | Status: DISCONTINUED | OUTPATIENT
Start: 2025-05-22 | End: 2025-05-22

## 2025-05-22 RX ORDER — SODIUM CHLORIDE 9 G/1000ML
1000 INJECTION, SOLUTION INTRAVENOUS
Refills: 0 | Status: DISCONTINUED | OUTPATIENT
Start: 2025-05-22 | End: 2025-05-22

## 2025-05-22 RX ADMIN — Medication 25 MICROGRAM(S): at 15:01

## 2025-05-22 RX ADMIN — SODIUM CHLORIDE 75 MILLILITER(S): 9 INJECTION, SOLUTION INTRAVENOUS at 15:13

## 2025-05-22 RX ADMIN — Medication 25 MICROGRAM(S): at 14:46

## 2025-05-22 NOTE — ASU DISCHARGE PLAN (ADULT/PEDIATRIC) - CARE PROVIDER_API CALL
Alison Turk  Urology  [address] [facility] [place] [address]  Phone: [phone]  Fax: [phone]  Follow Up Time:

## 2025-05-22 NOTE — BRIEF OPERATIVE NOTE - NSICDXBRIEFPROCEDURE_GEN_ALL_CORE_FT
PROCEDURES:  Biopsy of prostate by transperineal approach with integrated magnetic resonance-ultrasound guidance 22-May-2025 13:44:28  John Daniels

## 2025-05-22 NOTE — ASU DISCHARGE PLAN (ADULT/PEDIATRIC) - ASU DC SPECIAL INSTRUCTIONSFT
PROSTATE BIOPSY    GENERAL: Expect blood in the urine, stool, and ejaculate for up to two (2) months postoperatively. This is normal and to be expected after this procedure. Increase hydration to keep the urine as clear as possible.    SURGICAL WOUND: There are often lumps and bumps that can be felt in the perineum (skin between scrotum and anus) for up to two (2) months or more post operatively. These are of no concern and with time they will soften and disappear.  Any “black and blue” bruising areas will also resolve.  You may apply an ice-pack for 15 minutes out of every hour for the first 24 -36 hours to minimize pain and swelling. You may apply over the counter Bacitracin to the wound several times a day, and keep covered with over the counter gauze as necessary.    PAIN: You may have some intermittent pain for up to six (6) weeks post operatively. Pain does not signify any problem unless associated with fever, chills, or inability to void.  If you experience any fevers or chills please call immediately as this may be signs of an infection. You may take Tylenol (acetaminophen) 650-975mg and/or Motrin (ibuprofen) 400-600mg, both available over the counter, for pain every 6 hours as needed. Do not exceed 4000mg of Tylenol (acetaminophen) daily. You may alternate these medications such that you take one or the other every 3 hours for around the clock pain coverage.     BATHING: You may shower with soap and water, and pat dry the needle insertion sites in the perineum. Avoid sitting in water for prolonged periods of time for the next few days.    DIET: You may resume your regular diet and regular medication regimen.    ACTIVITY: No heavy lifting or strenuous exercise until you are evaluated at your post-operative appointment. Otherwise, you may return to your usual level of physical activity.    FOLLOW-UP: If you did not already schedule your post-operative appointment, please call your urologist to schedule and follow-up appointment.    CALL YOUR UROLOGIST IF: You have any bleeding that does not stop, inability to void >8 hours, fever over 100.4 F, chills, persistent nausea/vomiting, changes in your incision concerning for infection, or if your pain is not controlled on your discharge pain medications.

## 2025-05-22 NOTE — ASU PREOP CHECKLIST - 1.
Pt admitted to Fall in April 2025 ( Tripped and fell against table)  Went to PCP and was sent for x ray Due to C/O right side pain  told had Fx Rib. Per pt States still feels occasional discomfort told to inform anesthesia. Confirmed with Dr. Castro,  pt did not mention  Now made aware

## 2025-05-22 NOTE — ASU DISCHARGE PLAN (ADULT/PEDIATRIC) - FINANCIAL ASSISTANCE
[facility] provides services at a reduced cost to those who are determined to be eligible through [facility]’s financial assistance program. Information regarding [facility]’s financial assistance program can be found by going to [email or web address] or by calling [phone].

## 2025-05-23 ENCOUNTER — NON-APPOINTMENT (OUTPATIENT)
Age: 70
End: 2025-05-23

## 2025-05-29 ENCOUNTER — NON-APPOINTMENT (OUTPATIENT)
Age: 70
End: 2025-05-29

## 2025-06-23 ENCOUNTER — APPOINTMENT (OUTPATIENT)
Dept: UROLOGY | Facility: CLINIC | Age: 70
End: 2025-06-23
Payer: MEDICARE

## 2025-06-23 VITALS
HEIGHT: 66 IN | TEMPERATURE: 97.1 F | BODY MASS INDEX: 24.43 KG/M2 | OXYGEN SATURATION: 98 % | SYSTOLIC BLOOD PRESSURE: 124 MMHG | HEART RATE: 69 BPM | DIASTOLIC BLOOD PRESSURE: 82 MMHG | WEIGHT: 152 LBS

## 2025-06-23 PROBLEM — N40.1 BENIGN PROSTATIC HYPERPLASIA WITH LOWER URINARY TRACT SYMPTOMS, SYMPTOM DETAILS UNSPECIFIED: Status: ACTIVE | Noted: 2025-06-23

## 2025-06-23 PROCEDURE — 99213 OFFICE O/P EST LOW 20 MIN: CPT

## 2025-06-23 RX ORDER — ALFUZOSIN HYDROCHLORIDE 10 MG/1
10 TABLET, EXTENDED RELEASE ORAL DAILY
Qty: 90 | Refills: 3 | Status: ACTIVE | COMMUNITY
Start: 2025-06-23 | End: 1900-01-01

## 2025-08-14 ENCOUNTER — APPOINTMENT (OUTPATIENT)
Dept: UROLOGY | Facility: CLINIC | Age: 70
End: 2025-08-14

## (undated) DEVICE — NDL BIOPSY CHIBA 22G X 20CM

## (undated) DEVICE — DRSG TELFA 3 X 8

## (undated) DEVICE — NDL HYPO SAFE 25G X 1.5"

## (undated) DEVICE — DRAPE LAPAROTOMY W POUCHES

## (undated) DEVICE — SUT POLYSORB 2-0 18" TIES UNDYED

## (undated) DEVICE — SUT POLYSORB 3-0 30" V-20 UNDYED

## (undated) DEVICE — BLANKET WARMER UPPER ADULT

## (undated) DEVICE — FOR-ESU VALLEYLAB T7E15008DX: Type: DURABLE MEDICAL EQUIPMENT

## (undated) DEVICE — GRID BRACHYTHERAPY EZ 18G

## (undated) DEVICE — DRAPE LIGHT HANDLE COVER BLUE

## (undated) DEVICE — DRSG TEGADERM 4X4.75"

## (undated) DEVICE — SYR LUER LOK 10CC

## (undated) DEVICE — GLV 7 PROTEXIS

## (undated) DEVICE — SUT SURGIPRO 1 30" GS-21

## (undated) DEVICE — ELCTR GROUNDING PAD ADULT COVIDIEN

## (undated) DEVICE — DRAPE MEDIUM SHEET 44" X 70"

## (undated) DEVICE — SYR CATH TIP 2 OZ

## (undated) DEVICE — PREP CHLORAPREP HI-LITE ORANGE 26ML

## (undated) DEVICE — WRAP COMPRESSION CALF MED

## (undated) DEVICE — PACK MINOR NO DRAPE

## (undated) DEVICE — SYR LUER LOK 50CC

## (undated) DEVICE — FOLEY TRAY 16FR LF URINE METER SURESTEP

## (undated) DEVICE — SOL IRR POUR NS 0.9% 1500ML

## (undated) DEVICE — SUT POLYSORB 4-0 27" P-12 UNDYED

## (undated) DEVICE — GLV 8 PROTEXIS (WHITE)

## (undated) DEVICE — DRSG 4X4

## (undated) DEVICE — DRSG MASTISOL

## (undated) DEVICE — DRAPE HALF SHEET 40X57"

## (undated) DEVICE — SPONGE DISSECTOR PEANUT

## (undated) DEVICE — NDL MAX CORE 18G X 25CM

## (undated) DEVICE — BALLOON ENDOCAVITY 2X14CM

## (undated) DEVICE — NDL HYPO SAFE 22G X 1.5"

## (undated) DEVICE — NDL HYPO REGULAR BEVEL 25G X 1.5" (BLUE)

## (undated) DEVICE — PLASTIC SOLUTION BOWL 160Z

## (undated) DEVICE — DRAPE TOWEL BLUE 17" X 24"

## (undated) DEVICE — SYR TOOMEY 50ML

## (undated) DEVICE — GLV 8.5 PROTEXIS (WHITE)

## (undated) DEVICE — PREP CHLORAPREP ORANGE 2PCT 26ML

## (undated) DEVICE — NDL HYPO SAFE 18G X 1.5"

## (undated) DEVICE — SYR LUER LOK 20CC

## (undated) DEVICE — GLV 7.5 PROTEXIS

## (undated) DEVICE — SUT SURGIPRO 2-0 30" GS-22

## (undated) DEVICE — SUT POLYSORB 0 36" GS-25

## (undated) DEVICE — BOSTON SCIENTIFIC UROLOK II SCOPE ADAPTOR

## (undated) DEVICE — SUT POLYSORB 3-0 36" GS-21

## (undated) DEVICE — DRAIN PENROSE 5/8" X 18"

## (undated) DEVICE — GRID BRACHYTHERAPY EZ 14G

## (undated) DEVICE — SUT SURGIPRO II 1 30" GS-25

## (undated) DEVICE — SUT POLYSORB 2-0 30" V-20 UNDYED

## (undated) DEVICE — PACK CYSTO